# Patient Record
Sex: FEMALE | Race: BLACK OR AFRICAN AMERICAN | Employment: UNEMPLOYED | URBAN - METROPOLITAN AREA
[De-identification: names, ages, dates, MRNs, and addresses within clinical notes are randomized per-mention and may not be internally consistent; named-entity substitution may affect disease eponyms.]

---

## 2024-04-01 ENCOUNTER — TELEPHONE (OUTPATIENT)
Dept: ORTHOPEDIC SURGERY | Age: 29
End: 2024-04-01

## 2024-04-15 ENCOUNTER — OFFICE VISIT (OUTPATIENT)
Dept: ORTHOPEDIC SURGERY | Age: 29
End: 2024-04-15
Payer: COMMERCIAL

## 2024-04-15 VITALS — HEIGHT: 63 IN

## 2024-04-15 DIAGNOSIS — R29.898 WEAKNESS OF RIGHT LEG: ICD-10-CM

## 2024-04-15 DIAGNOSIS — M25.561 RIGHT KNEE PAIN, UNSPECIFIED CHRONICITY: Primary | ICD-10-CM

## 2024-04-15 PROCEDURE — 99204 OFFICE O/P NEW MOD 45 MIN: CPT | Performed by: PHYSICIAN ASSISTANT

## 2024-04-15 ASSESSMENT — ENCOUNTER SYMPTOMS
SHORTNESS OF BREATH: 0
VOMITING: 0
COUGH: 0
COLOR CHANGE: 0

## 2024-04-15 NOTE — PROGRESS NOTES
[facility] PHYSICIANS [facility] ORTHO SPECIALISTS  [address]  [place] [address]  Dept: [phone]    Ambulatory Orthopedic New Patient Visit      CHIEF COMPLAINT:    Chief Complaint   Patient presents with    Pain     New Patient, Rt knee pain. Fell last year.        HISTORY OF PRESENT ILLNESS:      Patient is Deaf - ASL interpreters used    #[id number] VaishaliLam  #[id number] - Belen    The patient is a 28 y.o. female who is being seen  for consultation and evaluation of 5 years of right leg weakness/stiffness. She notes that when she was 22yo she had pain within the right knee and her family didn't help her to get medical evaluation. She notes that she feels like her right knee and ankle are weak which causes her to be unsteady when she walks. She notes that she does not use a can or a walker.    She denies a specific trauma, injury or surgery to the right hip, knee or ankle. She denies symptoms within the left lower extremity. She denies numbness or tingling in the right lower extremity. She denies muscle tightness but states that she's been waling with a limp. She denies pain in her low back or right hip. She arrived in a wheelchair today.      Past Medical History:    No past medical history on file.    Past Surgical History:    No past surgical history on file.    Current Medications:   No current outpatient medications on file.     No current facility-administered medications for this visit.       Allergies:    Patient has no known allergies.    Social History:   Social History     Socioeconomic History    Marital status: Single     Spouse name: Not on file    Number of children: Not on file    Years of education: Not on file    Highest education level: Not on file   Occupational History    Not on file   Tobacco Use    Smoking status: Never    Smokeless tobacco: Never   Substance and Sexual Activity    Alcohol use: Not on file    Drug use: Not on file    Sexual

## 2024-05-24 DIAGNOSIS — R29.898 WEAKNESS OF RIGHT LEG: ICD-10-CM

## 2024-05-24 DIAGNOSIS — M54.50 LOW BACK PAIN, UNSPECIFIED BACK PAIN LATERALITY, UNSPECIFIED CHRONICITY, UNSPECIFIED WHETHER SCIATICA PRESENT: Primary | ICD-10-CM

## 2024-05-24 NOTE — PROGRESS NOTES
Patient is unable to have a MRI of her lumbar spine because she has a cochlear implant. I am going to place an order to neurosurgery for further evaluation of her lower leg weakness likely stemming from her lumbar spine.

## 2024-07-01 ENCOUNTER — OFFICE VISIT (OUTPATIENT)
Dept: NEUROSURGERY | Age: 29
End: 2024-07-01
Payer: COMMERCIAL

## 2024-07-01 VITALS
HEART RATE: 97 BPM | SYSTOLIC BLOOD PRESSURE: 101 MMHG | BODY MASS INDEX: 31.86 KG/M2 | DIASTOLIC BLOOD PRESSURE: 70 MMHG | HEIGHT: 63 IN | WEIGHT: 179.8 LBS

## 2024-07-01 DIAGNOSIS — R10.9 RIGHT LATERAL ABDOMINAL PAIN: ICD-10-CM

## 2024-07-01 DIAGNOSIS — M54.50 CHRONIC RIGHT-SIDED LOW BACK PAIN WITHOUT SCIATICA: Primary | ICD-10-CM

## 2024-07-01 DIAGNOSIS — G89.29 CHRONIC RIGHT-SIDED LOW BACK PAIN WITHOUT SCIATICA: Primary | ICD-10-CM

## 2024-07-01 PROCEDURE — 99203 OFFICE O/P NEW LOW 30 MIN: CPT | Performed by: NURSE PRACTITIONER

## 2024-07-01 NOTE — PROGRESS NOTES
Eyes: Negative for discharge and itching.   Respiratory: Negative for choking and chest tightness.    Cardiovascular: Negative for chest pain and leg swelling.   Gastrointestinal: Negative for nausea and abdominal pain.   Endocrine: Negative for cold intolerance and heat intolerance.   Genitourinary: Negative for frequency and flank pain.   Musculoskeletal: Negative for myalgias and joint swelling.   Skin: Negative for rash and wound.   Allergic/Immunologic: Negative for environmental allergies and food allergies.   Hematological: Negative for adenopathy. Does not bruise/bleed easily.   Psychiatric/Behavioral: Negative for self-injury. The patient is not nervous/anxious.      Physical Exam:      /70 (Site: Left Upper Arm, Position: Sitting, Cuff Size: Large Adult)   Pulse 97   Ht 1.6 m (5' 3\")   Wt 81.6 kg (179 lb 12.8 oz)   BMI 31.85 kg/m²   Estimated body mass index is 31.85 kg/m² as calculated from the following:    Height as of this encounter: 1.6 m (5' 3\").    Weight as of this encounter: 81.6 kg (179 lb 12.8 oz).    General:  Jeramie Lainez is a 28 y.o. year old female who appears her stated age.   HEENT: Normocephalic atraumatic. Neck supple.  Chest: regular rate; pulses equal  Abdomen: Soft nontender nondistended.  Ext: DP and PT pulses 2+, good cap refill  Neuro    Mentation  Appropriate affect  Registration intact  Orientation intact  Judgment intact to situation    Cranial Nerves:   Pupils equal and reactive to light  Extraocular motion intact  Face and shrug symmetric  Tongue midline  No dysarthria  v1-3 sensation symmetric, masseter tone symmetric    Sensation: Intact    Motor  L deltoid 5/5; R deltoid 5/5  L biceps 5/5; R biceps 5/5  L triceps 5/5; R triceps 5/5  L wrist extension 5/5; R wrist extension 5/5  L intrinsics 5/5; R intrinsics 5/5     L hip flexion 5/5 , R hip flexion 5/5  L knee extension 5/5; R knee extension 5/5  L Dorsiflexion 5/5; R dorsiflexion 5/5  L Plantarflexion

## 2024-07-01 NOTE — CONSULTS
Session ID: [id number]  Language: ASL   ID: #[id number]   Name: Carol Implemented All Universal Safety Interventions:  Kake to call system. Call bell, personal items and telephone within reach. Instruct patient to call for assistance. Room bathroom lighting operational. Non-slip footwear when patient is off stretcher. Physically safe environment: no spills, clutter or unnecessary equipment. Stretcher in lowest position, wheels locked, appropriate side rails in place.

## 2024-07-15 ENCOUNTER — HOSPITAL ENCOUNTER (OUTPATIENT)
Dept: PHYSICAL THERAPY | Facility: CLINIC | Age: 29
Setting detail: THERAPIES SERIES
Discharge: HOME | End: 2024-07-15
Payer: COMMERCIAL

## 2024-07-15 PROCEDURE — 97110 THERAPEUTIC EXERCISES: CPT

## 2024-07-15 PROCEDURE — 97162 PT EVAL MOD COMPLEX 30 MIN: CPT

## 2024-07-16 NOTE — CONSULTS
Silver Fall Risk Assessment    Patient Name:  Jeramie Lainez  : 1995    Risk Factor Scale  Score   History of Falls [x] Yes  [] No 25  0 25   Secondary Diagnosis [] Yes  [x] No 15  0 0   Ambulatory Aid [] Furniture  [x] Crutches/cane/walker  [] None/bedrest/wheelchair/nurse 30  15  0 15   IV/Heparin Lock [] Yes  [x] No 20  0 0   Gait/Transferring [x] Impaired  [] Weak  [] Normal/bedrest/immobile 20  10  0 20   Mental Status [] Forgets limitations  0 Oriented to own ability 15  0       Total:60     Based on the Assessment score: check the appropriate box.    []  No intervention needed   Low =   Score of 0-24    []  Use standard prevention interventions Moderate =  Score of 24-44   [] Give patient handout and discuss fall prevention strategies   [] Establish goal of education for patient/family RE: fall prevention strategies    [x]  Use high risk prevention interventions High = Score of 45 and higher   [x] Give patient handout and discuss fall prevention strategies   [x] Establish goal of education for patient/family Re: fall prevention strategies   [x] Discuss lifeline / other resources    Electronically signed by:   FANNY ALTMAN, PT  Date: 2024        Outpatient Physical Rehabilitation Fall Prevention Intervention    Exercises for balance and improving leg strength are beneficial    Use handrails when walking up and down the stairs. You many want handrails on both sides of the stairs.  Turn on the lights before entering a dark room.  Use night lights and have a lamp close at night. Get up slowly. Sit for a few seconds before rising.   Remove all throw rugs to prevent tripping.  Use a reacher to help  items.  If using a step stool use one with handrail. DO NOT stand on a chair.   Carry a cell phone with you and/or have a house phone you can reach from the ground. May use a whistle.  Take your cell phone with you when going out the back door, to the 
tasks)  Exercises:  Exercise Reps/ Time Weight/ Level Comments   SciFit   next   bridging 10  Difficult to control right leg   SLR right     Piriformis stretch 6x each 30 sec holds                gait      Sit to stands from mat            Practice rolling to side to then come upright to sitting X     Other:  Reports slight relief after massage gun - cushioned head    Specific Instructions for next treatment:  Further assess gait - trial with walker  Mechanics if using cane to move it to the left side.  Issue HEP, mat strengthening for core and LE's    Other - per Epic on 4- was given DME information for a walker, ask pt if obtained??? (Dr. Ybarra's office, orthopedics)      Evaluation Complexity:  History (Personal factors, comorbidities) [] 0 [x] 1-2 [] 3+   Exam (limitations, restrictions) [] 1-2 [] 3 [x] 4+  Gait, balance, strength, pain, bladder   Clinical presentation (progression) [] Stable [] Evolving  [x] Unstable   Decision Making [] Low [] Moderate [] High    [] Low Complexity [] Moderate Complexity [] High Complexity       Treatment Charges: Mins Units   [x] Evaluation       []  Low       [x]  Moderate       []  High 37 1   []  Modalities     [x]  Ther Exercise 10 1   [x]  Manual Therapy 8 0   []  Ther Activities     []  Aquatics     []  Vasocompression     []  Other       TOTAL BILLABLE TIME: 55 min    Time in: 2:05 pm      Time out: 3:00 pm    Electronically signed by: FANNY ALTMAN PT        Physician Signature:________________________________Date:__________________  By signing above or cosigning this note, I have reviewed this plan of care and certify a need for medically necessary rehabilitation services.     *PLEASE SIGN ABOVE AND FAX BACK ALL PAGES*

## 2024-07-24 ENCOUNTER — HOSPITAL ENCOUNTER (OUTPATIENT)
Dept: PHYSICAL THERAPY | Facility: CLINIC | Age: 29
Setting detail: THERAPIES SERIES
Discharge: HOME | End: 2024-07-24
Payer: COMMERCIAL

## 2024-07-24 NOTE — FLOWSHEET NOTE
[] [facility]  Outpatient Rehabilitation &  Therapy  [address]  [phone]  [phone] [x] [facility]  Outpatient Rehabilitation &  Therapy  [address]  P: [phone]  F: [phone] [] [facility]  Outpatient Rehabilitation &  Therapy  [address]  P: [phone]  F: [phone] [] [facility]  Outpatient Rehabilitation &  Therapy  [address]  [phone]  [phone] [] [facility]  Outpatient Rehabilitation &  Therapy  [address]   P: [phone]  F: [phone]  [] [facility]  Outpatient Rehabilitation &  Therapy  [address]  P: [phone]  F: [phone] [] [facility]  Outpatient Rehabilitation &  Therapy  [address]  P: [phone]  F: [phone] [] [facility]  Outpatient Rehabilitation &  Therapy  [address]  P: [phone]  F: [phone] [] [facility]  Outpatient Rehabilitation &  Therapy  [address]  P: [phone]  F: [phone]  [] [facility] Outpatient Rehabilitation &  Therapy  [address]  P: [phone]  F: [phone]     Therapy Cancel/No Show note    Date: 2024  Patient: Jeramie Lainez  : 1995  MRN: [id number]    Cancels/No Shows to date: 1/0    For today's appointment patient:    [x]  Cancelled    [] Rescheduled appointment    [] No-show     Reason given by patient:    []  Patient ill    []  Conflicting appointment    [x] No transportation      [] Conflict with work    [] No reason given    [] Weather related    [] COVID-19    [] Other:      Comments:        [x] Next appointment was confirmed    Electronically signed by: DANUTA ROGERS PTA

## 2024-07-29 ENCOUNTER — HOSPITAL ENCOUNTER (OUTPATIENT)
Dept: PHYSICAL THERAPY | Facility: CLINIC | Age: 29
Setting detail: THERAPIES SERIES
Discharge: HOME | End: 2024-07-29
Payer: COMMERCIAL

## 2024-07-29 PROCEDURE — 97140 MANUAL THERAPY 1/> REGIONS: CPT

## 2024-07-29 PROCEDURE — 97110 THERAPEUTIC EXERCISES: CPT

## 2024-07-29 NOTE — FLOWSHEET NOTE
[] [facility]  Outpatient Rehabilitation &  Therapy  [address]  [phone]  [phone] [x] [facility]  Outpatient Rehabilitation &  Therapy  [address]  P: [phone]  F: [phone] [] [facility]  Outpatient Rehabilitation &  Therapy  [address]  P: [phone]  F: [phone]  [] [facility] Outpatient Rehabilitation &  Therapy  [address]  P: [phone]  F: [phone]     Physical Therapy Daily Treatment Note    Date:  2024  Patient Name:  Jeramie Lianez    :  1995  MRN: [id number]  Physician: MYESHA Adams - IRENE                                 Insurance: [employer]  (30 visits till needs auth)  Medical Diagnosis: M54.50, G89.29 (ICD-10-CM) - Chronic right-sided low back pain without sciatica                        Right LE weakness, gait disturbances             Rehab Codes: M54.59,  M25.551, M62.591, Z91.81, R20.2R26.2, R26.89, M79.651  Onset Date: 2024 date of script (ongoing since )                         Next 's appt.: 2024      Used - (for ASL, initial  also added additional  for further clarification)  Caprice Paredes (deaf)  Visit# / total visits: ;    Cancels/No Shows: 1  0    Session Code 525 41-  used    Session ID: [id number]  Language: ASL   ID: #[id number]   Name: River          Subjective:    Pain:  [x] Yes  [] No Location: right low back and thigh N/A Pain Rating: (0-10 scale) 6/10  Pain altered Tx:  [x] No  [] Yes  Action:  Comments:Feeling good, any pain , some in the right hip region.  Started using standard walker, pain on right side,  using Epsom salt, helpful. Some improvement with walking, \"slipped a few times\" but not fallen. Pain down right thigh, up to 6/10   After last session, little better, some improvement, but slow

## 2024-08-01 ENCOUNTER — APPOINTMENT (OUTPATIENT)
Dept: PHYSICAL THERAPY | Facility: CLINIC | Age: 29
End: 2024-08-01
Payer: COMMERCIAL

## 2024-08-05 ENCOUNTER — HOSPITAL ENCOUNTER (OUTPATIENT)
Dept: PHYSICAL THERAPY | Facility: CLINIC | Age: 29
Setting detail: THERAPIES SERIES
Discharge: HOME | End: 2024-08-05
Payer: COMMERCIAL

## 2024-08-05 PROCEDURE — 97110 THERAPEUTIC EXERCISES: CPT

## 2024-08-05 PROCEDURE — 97140 MANUAL THERAPY 1/> REGIONS: CPT

## 2024-08-05 NOTE — FLOWSHEET NOTE
rather making her aware, fiance in room and also aware. --- none present on 8/5  [x] Patient would continue to benefit from skilled physical therapy services in order to: improve gait patterning, decrease fall risk, improve LE strength and improve core strength.     STG: (to be met in 7 treatments)  ? Pain:from 6/10 to 4/10 max in right low back to allow her to actively exercise for 25 minutes for strengthening and stretching  Stand up with equal weight bearing right to left legs demonstrating improved upright posturing  ? Strength: Ability to stand for 5 minutes at counter for light grooming, kitchen work  Ability to walk safely for 300 ft with appropriate assistive device with stand by assistance.  Patient to be independent with home exercise program as demonstrated by performance with correct form without cues.  Demonstrate Knowledge of fall prevention-7- initiated, rec use of walker, shower seat.  Ability to transfer sitting to supine and sitting to standing independently.     LTG: (to be met in 14 treatments)  Restore full, pain-free, lumbar AROM limitations throughout to reduce difficulty with ADLs including leaning forward to put shoes and socks on and off  50% pain reduction to allow her to stand 10 minutes for self care, kitchen tasks  Ability to walk 450 feet in clinic safely with appropriate assistive device including up and down 4 inch step for curb simulation     4.  Demonstrate good standing balance while able to use UE's for tasks like moving 3# wt around counter to simulate kitchen, bathroom tasks     Patient goals:    less pain, walk better    Pt. Education:  [x] Yes  [] No  [] Reviewed Prior HEP/Ed  Method of Education: [x] Verbal  [x] Demo  [x] Written  [id number] send additional exercises to her code, pt understood-communicates she has been watching the videos  Access Code: [email or web address]  URL: [email or web address]  Date: 07/29/2024  Prepared by: Norma Allison    Exercises  -  -

## 2024-08-13 ENCOUNTER — HOSPITAL ENCOUNTER (OUTPATIENT)
Dept: PHYSICAL THERAPY | Facility: CLINIC | Age: 29
Setting detail: THERAPIES SERIES
Discharge: HOME | End: 2024-08-13
Payer: COMMERCIAL

## 2024-08-13 PROCEDURE — 97110 THERAPEUTIC EXERCISES: CPT

## 2024-08-13 PROCEDURE — 97140 MANUAL THERAPY 1/> REGIONS: CPT

## 2024-08-13 NOTE — FLOWSHEET NOTE
exercises to her code, pt understood-communicates she has been watching the videos  Access Code: [email or web address]  URL: [email or web address]  Date: 07/29/2024  Prepared by: Norma Allison    Exercises  -  - Standing Heel Raise with Chair Support  - 2 x daily - 7 x weekly - 1 sets - 15 reps  - Standing Hip Extension with Counter Support  - 2 x daily - 7 x weekly - 1 sets - 15 reps  - Standing Hip Abduction with Counter Support  - 2 x daily - 7 x weekly - 1 sets - 15 reps  - Standing March with Counter Support  - 2 x daily - 7 x weekly - 1 sets - 15 reps  - Sit to Stand  - 2 x daily - 7 x weekly - 1 sets - 8 reps    Date: 07/15/2024  Prepared by: Norma Allison  Exercises  - Supine Bridge  - 2 x daily - 7 x weekly - 1 sets - 10 reps - 5 hold  - Supine Active Straight Leg Raise  - 2 x daily - 7 x weekly - 1 sets - 10 reps  - Supine Piriformis Stretch  - 2 x daily - 7 x weekly - 1 sets - 10 reps - 10 hold    Date: 08/05/2024  Prepared by: Larry  Exercises  - Seated Long Arc Quad  - 1 x daily - 7 x weekly - 2 sets - 10 reps  - Seated Hamstring Curl with Anchored Resistance  - 1 x daily - 7 x weekly - 2 sets - 10 reps  - Seated Hip Abduction with Resistance  - 1 x daily - 7 x weekly - 2 sets - 10 reps  - Seated Hip Adduction Squeeze with Ball  - 1 x daily - 7 x weekly - 2 sets - 10 reps    Comprehension of Education:  [] Verbalizes understanding.  [] Demonstrates understanding.  [] Needs review.  [x] Demonstrates/verbalizes HEP/Ed previously given.     Plan: [x] Continue current frequency toward long and short term goals.    [x] Specific Instructions for subsequent treatments: continue to work on core, LE strengthening, gait mechanics      Time In: 200 pm            Time Out: 300  pm    Electronically signed by:  Larry Claudio PTA

## 2024-08-14 ENCOUNTER — HOSPITAL ENCOUNTER (OUTPATIENT)
Dept: PHYSICAL THERAPY | Facility: CLINIC | Age: 29
Setting detail: THERAPIES SERIES
Discharge: HOME | End: 2024-08-14
Payer: COMMERCIAL

## 2024-08-14 PROCEDURE — 97110 THERAPEUTIC EXERCISES: CPT

## 2024-08-14 NOTE — FLOWSHEET NOTE
[] [facility]  Outpatient Rehabilitation &  Therapy  [address]  [phone]  [phone] [x] [place]  Outpatient Rehabilitation &  Therapy  [address]  P: [phone]  F: [phone] [] [facility]  Outpatient Rehabilitation &  Therapy  [address]  P: [phone]  F: [phone]  [] [facility] Outpatient Rehabilitation &  Therapy  [address]  P: [phone]  F: [phone]     Physical Therapy Daily Treatment Note    Date:  2024  Patient Name:  Jeramie Lainez    :  1995  MRN: [id number]  Physician: MYESHA Adams - IRENE                                 Insurance: [employer]  (30 visits till needs auth)  Medical Diagnosis: M54.50, G89.29 (ICD-10-CM) - Chronic right-sided low back pain without sciatica            Right LE weakness, gait disturbances             Rehab Codes: M54.59,  M25.551, M62.591, Z91.81, R20.2R26.2, R26.89, M79.651  Onset Date: 2024 date of script (ongoing since )                         Next 's appt.: 2024      Used - (for ASL, initial  also added additional  for further clarification) ---  not available until after 8 am  Caprice Paredes (deaf)  Visit# / total visits: ;    Cancels/No Shows:     Session ID: [id number]  Language: ASL   ID: #[id number]   Name: Clarence      Subjective:    Pain:  [x] Yes  [] No Location:  Pain Rating: (0-10 scale) 0/10  Pain altered Tx:  [] No  [x] Yes  Action:discontinued hypervolt as no longer having pain  Comments: Pt arrives feeling good, tired, no pain in leg or back .    At home walking is going good.If walks needs a break at home but working on increasing ability to walk.      Objective:  Modalities: Precautions:balance deficits SBA  Exercises:  Exercise Reps/ Time Weight/ Level Comments   NuStep  6

## 2024-08-20 ENCOUNTER — HOSPITAL ENCOUNTER (OUTPATIENT)
Dept: PHYSICAL THERAPY | Facility: CLINIC | Age: 29
Setting detail: THERAPIES SERIES
Discharge: HOME | End: 2024-08-20
Payer: COMMERCIAL

## 2024-08-20 PROCEDURE — 97110 THERAPEUTIC EXERCISES: CPT

## 2024-08-20 NOTE — FLOWSHEET NOTE
step for curb simulation     4.  Demonstrate good standing balance while able to use UE's for tasks like moving 3# wt around counter to simulate kitchen, bathroom tasks     Patient goals:    less pain, walk better    Pt. Education:  [x] Yes  [] No  [x] Reviewed Prior HEP/Ed  Method of Education: [x] Verbal  [x] Demo  [] Written (8/14 pt reports will access on video)  Access Code: [email or web address]  URL: [email or web address]  Date: 08/14/2024  Prepared by: Norma Allison    Exercises  - Standing Terminal Knee Extension with Resistance  - 2 x daily - 7 x weekly - 1 sets - 10 reps  [id number] send additional exercises to her code, pt understood-communicates she has been watching the videos    Access Code: [email or web address]  URL: [email or web address]  Date: 07/29/2024  Prepared by: Norma Allison    Exercises  -  - Standing Heel Raise with Chair Support  - 2 x daily - 7 x weekly - 1 sets - 15 reps  - Standing Hip Extension with Counter Support  - 2 x daily - 7 x weekly - 1 sets - 15 reps  - Standing Hip Abduction with Counter Support  - 2 x daily - 7 x weekly - 1 sets - 15 reps  - Standing March with Counter Support  - 2 x daily - 7 x weekly - 1 sets - 15 reps  - Sit to Stand  - 2 x daily - 7 x weekly - 1 sets - 8 reps    Date: 07/15/2024  Prepared by: Norma Allison  Exercises  - Supine Bridge  - 2 x daily - 7 x weekly - 1 sets - 10 reps - 5 hold  - Supine Active Straight Leg Raise  - 2 x daily - 7 x weekly - 1 sets - 10 reps  - Supine Piriformis Stretch  - 2 x daily - 7 x weekly - 1 sets - 10 reps - 10 hold    Date: 08/05/2024  Prepared by: Larry  Exercises  - Seated Long Arc Quad  - 1 x daily - 7 x weekly - 2 sets - 10 reps  - Seated Hamstring Curl with Anchored Resistance  - 1 x daily - 7 x weekly - 2 sets - 10 reps  - Seated Hip Abduction with Resistance  - 1 x daily - 7 x weekly - 2 sets - 10 reps  - Seated Hip Adduction Squeeze with Ball  - 1 x daily - 7 x weekly - 2 sets - 10 reps    Comprehension of

## 2024-08-26 ENCOUNTER — HOSPITAL ENCOUNTER (OUTPATIENT)
Dept: PHYSICAL THERAPY | Facility: CLINIC | Age: 29
Setting detail: THERAPIES SERIES
Discharge: HOME | End: 2024-08-26
Payer: COMMERCIAL

## 2024-08-26 ENCOUNTER — OFFICE VISIT (OUTPATIENT)
Dept: NEUROSURGERY | Age: 29
End: 2024-08-26
Payer: COMMERCIAL

## 2024-08-26 VITALS
HEART RATE: 88 BPM | BODY MASS INDEX: 31.71 KG/M2 | SYSTOLIC BLOOD PRESSURE: 121 MMHG | OXYGEN SATURATION: 98 % | WEIGHT: 179 LBS | DIASTOLIC BLOOD PRESSURE: 86 MMHG | HEIGHT: 63 IN

## 2024-08-26 DIAGNOSIS — R25.8 CLONUS: ICD-10-CM

## 2024-08-26 DIAGNOSIS — Z96.21 COCHLEAR IMPLANT STATUS: ICD-10-CM

## 2024-08-26 DIAGNOSIS — R26.0 ATAXIC GAIT: ICD-10-CM

## 2024-08-26 DIAGNOSIS — R29.2 HOFFMAN SIGN PRESENT: ICD-10-CM

## 2024-08-26 DIAGNOSIS — M47.26 OTHER SPONDYLOSIS WITH RADICULOPATHY, LUMBAR REGION: Primary | ICD-10-CM

## 2024-08-26 PROCEDURE — 97110 THERAPEUTIC EXERCISES: CPT

## 2024-08-26 PROCEDURE — 99214 OFFICE O/P EST MOD 30 MIN: CPT | Performed by: NURSE PRACTITIONER

## 2024-08-26 NOTE — FLOWSHEET NOTE
Demonstrates understanding.  [] Needs review.  [x] Demonstrates/verbalizes HEP/Ed previously given.     Plan: [x] Continue current frequency toward long and short term goals.    [x] Specific Instructions for subsequent treatments: continue to work on core, LE strengthening, gait mechanics      Time In: 3: 55 pm            Time Out:   4: 45 pm    Electronically signed by:  FANNY ALTMAN, PT

## 2024-08-26 NOTE — PROGRESS NOTES
beats  Babinski L: neg  Babinski R: neg    +Ataxic gait    Studies Review:     PT notes reviewed    Assessment and Plan:     1. Other spondylosis with radiculopathy, lumbar region    2. Ataxic gait    3. Clonus    4. Smith sign present    5. Cochlear implant status         Plan: Patient with overall improvement to pain, however still struggling with ataxic gait, some right lower extremity spasticity as well as positive clonus to right foot and Tarah's bilaterally.  Recommend proceeding with advanced imaging for further evaluation of spinal cord compression.  Patient unable to undergo MRI due to cochlear implant.  Will obtain myelogram of cervical, thoracic as well as lumbar spine.  Patient to return in 4 weeks for reevaluation.  Continue therapy measures.    Followup: Return in about 4 weeks (around 9/23/2024), or if symptoms worsen or fail to improve.    Prescriptions Ordered:  No orders of the defined types were placed in this encounter.     Orders Placed:  Orders Placed This Encounter   Procedures    IR MYELOGRAM 2 OR MORE REGIONS     Standing Status:   Future     Standing Expiration Date:   8/26/2025     Order Specific Question:   Reason for exam:     Answer:   ataxia, clonus, +hoffmans    CT CERVICAL SPINE W CONTRAST     Standing Status:   Future     Standing Expiration Date:   8/26/2025     Order Specific Question:   STAT Creatinine as needed:     Answer:   No     Order Specific Question:   Reason for exam:     Answer:   post-myelogram    CT THORACIC SPINE W CONTRAST     Standing Status:   Future     Standing Expiration Date:   8/26/2025     Order Specific Question:   Additional Contrast?     Answer:   None     Order Specific Question:   STAT Creatinine as needed:     Answer:   No     Order Specific Question:   Reason for exam:     Answer:   post-myelogram    CT LUMBAR SPINE W CONTRAST     Standing Status:   Future     Standing Expiration Date:   8/26/2025     Order Specific Question:   STAT Creatinine as  needed:     Answer:   No     Order Specific Question:   Reason for exam:     Answer:   post-myelogram        Electronically signed by MYESHA Gallardo CNP on 8/26/2024 at 11:12 AM    Please note that this chart was generated using voice recognition Dragon dictation software.  Although every effort was made to ensure the accuracy of this automated transcription, some errors in transcription may have occurred.

## 2024-08-28 ENCOUNTER — HOSPITAL ENCOUNTER (OUTPATIENT)
Dept: PHYSICAL THERAPY | Facility: CLINIC | Age: 29
Setting detail: THERAPIES SERIES
Discharge: HOME | End: 2024-08-28
Payer: COMMERCIAL

## 2024-08-28 PROCEDURE — 97110 THERAPEUTIC EXERCISES: CPT

## 2024-08-28 NOTE — FLOWSHEET NOTE
[] [facility]  Outpatient Rehabilitation &  Therapy  [address]  [phone]  [phone] [x] [facility]  Outpatient Rehabilitation &  Therapy  [address]  P: [phone]  F: [phone] [] [facility]  Outpatient Rehabilitation &  Therapy  [address]  P: [phone]  F: [phone]  [] [facility] Outpatient Rehabilitation &  Therapy  [address]  P: [phone]  F: [phone]     Physical Therapy Daily Treatment Note    Date:  2024  Patient Name:  Jeramie Lainez    :  1995  MRN: [id number]  Physician: MYESHA Adams - IRENE                                 Insurance: [employer]  (30 visits till needs auth)  Medical Diagnosis: M54.50, G89.29 (ICD-10-CM) - Chronic right-sided low back pain without sciatica            Right LE weakness, gait disturbances             Rehab Codes: M54.59,  M25.551, M62.591, Z91.81, R20.2R26.2, R26.89, M79.651  Onset Date: 2024 date of script (ongoing since )                         Next 's appt.: 2024      Used - (for ASL, initial  also added additional  for further clarification) ---  not available until after 8 am  Caprice Paredes (deaf)  Visit# / total visits: ;    Cancels/No Shows: 1 / 0     session ID: [id number]    Subjective:    Pain:  [x] Yes  [] No Location:  Pain Rating: (0-10 scale) 0/10  Pain altered Tx:  [x] No  [] Yes  Action:    Comments: Pt. Arrives to therapy session continuing to deny any pain. Also denies any increased soreness or difficulty with the ex's performed at her last therapy session.    Objective:  Modalities:   Precautions:balance deficits SBA  Exercises:bolded completed  Exercise Reps/ Time Weight/ Level Comments   NuStep  6 min   L4          Supine      bridging 10x2   Difficult to control right leg   SLR -  shower seat , pt reports she is using  Ability to transfer sitting to supine and sitting to standing independently. 8- met     LTG: (to be met in 14 treatments)  Restore full, pain-free, lumbar AROM limitations throughout to reduce difficulty with ADLs including leaning forward to put shoes and socks on and off  50% pain reduction to allow her to stand 10 minutes for self care, kitchen tasks  Ability to walk 450 feet in clinic safely with appropriate assistive device including up and down 4 inch step for curb simulation     4.  Demonstrate good standing balance while able to use UE's for tasks like moving 3# wt around counter to simulate kitchen, bathroom tasks     Patient goals:    less pain, walk better    Pt. Education:  [x] Yes  [] No  [x] Reviewed Prior HEP/Ed  Method of Education: [x] Verbal  [x] Demo  [x] Written   Added sitting toe raises and reissued HEP    Access Code: [email or web address]  URL: [email or web address]  Date: 08/14/2024  Prepared by: Norma Allison    Exercises  - Standing Terminal Knee Extension with Resistance  - 2 x daily - 7 x weekly - 1 sets - 10 reps  [id number] send additional exercises to her code, pt understood-communicates she has been watching the videos    Access Code: [email or web address]  URL: [email or web address]  Date: 07/29/2024  Prepared by: Norma Allison    Exercises  -  - Standing Heel Raise with Chair Support  - 2 x daily - 7 x weekly - 1 sets - 15 reps  - Standing Hip Extension with Counter Support  - 2 x daily - 7 x weekly - 1 sets - 15 reps  - Standing Hip Abduction with Counter Support  - 2 x daily - 7 x weekly - 1 sets - 15 reps  - Standing March with Counter Support  - 2 x daily - 7 x weekly - 1 sets - 15 reps  - Sit to Stand  - 2 x daily - 7 x weekly - 1 sets - 8 reps    Date: 07/15/2024  Prepared by: Norma Allison  Exercises  - Supine Bridge  - 2 x daily - 7 x weekly - 1 sets - 10 reps - 5 hold  - Supine Active Straight Leg Raise  - 2 x daily - 7 x weekly - 1

## 2024-09-03 ENCOUNTER — HOSPITAL ENCOUNTER (OUTPATIENT)
Dept: PHYSICAL THERAPY | Facility: CLINIC | Age: 29
Setting detail: THERAPIES SERIES
Discharge: HOME | End: 2024-09-03
Payer: COMMERCIAL

## 2024-09-03 PROCEDURE — 97110 THERAPEUTIC EXERCISES: CPT

## 2024-09-03 NOTE — FLOWSHEET NOTE
seated rest break. Pt with intermittent reports of R knee discomfort through out session that is addressed with CP end of treatment.       [] No change.     [x] Other:    8/26 pending myelogram  [] Patient would continue to benefit from skilled physical therapy services in order to: improve gait patterning, decrease fall risk, improve LE strength and improve core strength.     STG: (to be met in 7 treatments) addressed by Norma Allison, PT 8-  ? Pain:from 6/10 to 4/10 max in right low back to allow her to actively exercise for 25 minutes for strengthening and stretching 8- met  Stand up with equal weight bearing right to left legs demonstrating improved upright posturing- 8/26/2024 progressing, improved with cueing  ? Strength: Ability to stand for 5 minutes at counter for light grooming, kitchen work 8- met per pt, ~ 10-15 minutes  Ability to walk safely for 300 ft with appropriate assistive device with stand by assistance. 8- met  Patient to be independent with home exercise program as demonstrated by performance with correct form without cues. 8- met  Demonstrate Knowledge of fall prevention-7- initiated, rec use of walker, shower seat , pt reports she is using  Ability to transfer sitting to supine and sitting to standing independently. 8- met     LTG: (to be met in 14 treatments)  Restore full, pain-free, lumbar AROM limitations throughout to reduce difficulty with ADLs including leaning forward to put shoes and socks on and off  50% pain reduction to allow her to stand 10 minutes for self care, kitchen tasks  Ability to walk 450 feet in clinic safely with appropriate assistive device including up and down 4 inch step for curb simulation     4.  Demonstrate good standing balance while able to use UE's for tasks like moving 3# wt around counter to simulate kitchen, bathroom tasks     Patient goals:    less pain, walk better    Pt. Education:  [x] Yes  [] No  [x]

## 2024-09-09 ENCOUNTER — HOSPITAL ENCOUNTER (OUTPATIENT)
Dept: PHYSICAL THERAPY | Facility: CLINIC | Age: 29
Setting detail: THERAPIES SERIES
Discharge: HOME | End: 2024-09-09
Payer: COMMERCIAL

## 2024-09-09 PROCEDURE — 97110 THERAPEUTIC EXERCISES: CPT

## 2024-09-10 ENCOUNTER — HOSPITAL ENCOUNTER (OUTPATIENT)
Dept: CT IMAGING | Age: 29
Discharge: HOME | End: 2024-09-12
Payer: COMMERCIAL

## 2024-09-10 ENCOUNTER — HOSPITAL ENCOUNTER (OUTPATIENT)
Dept: INTERVENTIONAL RADIOLOGY/VASCULAR | Age: 29
Discharge: HOME | End: 2024-09-12
Payer: COMMERCIAL

## 2024-09-10 VITALS
TEMPERATURE: 98 F | WEIGHT: 175 LBS | OXYGEN SATURATION: 100 % | SYSTOLIC BLOOD PRESSURE: 112 MMHG | DIASTOLIC BLOOD PRESSURE: 71 MMHG | BODY MASS INDEX: 31.01 KG/M2 | RESPIRATION RATE: 20 BRPM | HEIGHT: 63 IN | HEART RATE: 81 BPM

## 2024-09-10 DIAGNOSIS — R25.8 CLONUS: ICD-10-CM

## 2024-09-10 DIAGNOSIS — Z96.21 COCHLEAR IMPLANT STATUS: ICD-10-CM

## 2024-09-10 DIAGNOSIS — R29.2 HOFFMAN SIGN PRESENT: ICD-10-CM

## 2024-09-10 LAB — HCG SERPL QL: NEGATIVE

## 2024-09-10 PROCEDURE — 7100000041 HC SPAR PHASE II RECOVERY - ADDTL 15 MIN

## 2024-09-10 PROCEDURE — 7100000040 HC SPAR PHASE II RECOVERY - FIRST 15 MIN

## 2024-09-10 PROCEDURE — 72129 CT CHEST SPINE W/DYE: CPT

## 2024-09-10 PROCEDURE — 72126 CT NECK SPINE W/DYE: CPT

## 2024-09-10 PROCEDURE — 62305 MYELOGRAPHY LUMBAR INJECTION: CPT

## 2024-09-10 PROCEDURE — 72132 CT LUMBAR SPINE W/DYE: CPT

## 2024-09-10 PROCEDURE — 2580000003 HC RX 258: Performed by: PHYSICIAN ASSISTANT

## 2024-09-10 PROCEDURE — 6360000004 HC RX CONTRAST MEDICATION: Performed by: NURSE PRACTITIONER

## 2024-09-10 PROCEDURE — 84703 CHORIONIC GONADOTROPIN ASSAY: CPT

## 2024-09-10 RX ORDER — IOPAMIDOL 612 MG/ML
11 INJECTION, SOLUTION INTRATHECAL
Status: COMPLETED | OUTPATIENT
Start: 2024-09-10 | End: 2024-09-10

## 2024-09-10 RX ORDER — SODIUM CHLORIDE 9 MG/ML
INJECTION, SOLUTION INTRAVENOUS CONTINUOUS
Status: DISCONTINUED | OUTPATIENT
Start: 2024-09-10 | End: 2024-09-13 | Stop reason: HOSPADM

## 2024-09-10 RX ADMIN — SODIUM CHLORIDE: 9 INJECTION, SOLUTION INTRAVENOUS at 14:41

## 2024-09-10 RX ADMIN — IOPAMIDOL 11 ML: 612 INJECTION, SOLUTION INTRATHECAL at 16:14

## 2024-09-10 ASSESSMENT — PAIN - FUNCTIONAL ASSESSMENT: PAIN_FUNCTIONAL_ASSESSMENT: 0-10

## 2024-09-10 ASSESSMENT — PAIN DESCRIPTION - DESCRIPTORS: DESCRIPTORS: ACHING;OTHER (COMMENT)

## 2024-09-11 ENCOUNTER — HOSPITAL ENCOUNTER (OUTPATIENT)
Dept: PHYSICAL THERAPY | Facility: CLINIC | Age: 29
Setting detail: THERAPIES SERIES
Discharge: HOME | End: 2024-09-11
Payer: COMMERCIAL

## 2024-09-11 PROCEDURE — 97116 GAIT TRAINING THERAPY: CPT

## 2024-09-11 PROCEDURE — 97110 THERAPEUTIC EXERCISES: CPT

## 2024-09-16 ENCOUNTER — HOSPITAL ENCOUNTER (OUTPATIENT)
Dept: PHYSICAL THERAPY | Facility: CLINIC | Age: 29
Setting detail: THERAPIES SERIES
Discharge: HOME | End: 2024-09-16
Payer: COMMERCIAL

## 2024-09-16 PROCEDURE — 97110 THERAPEUTIC EXERCISES: CPT

## 2024-09-18 ENCOUNTER — HOSPITAL ENCOUNTER (OUTPATIENT)
Dept: PHYSICAL THERAPY | Facility: CLINIC | Age: 29
Setting detail: THERAPIES SERIES
Discharge: HOME | End: 2024-09-18
Payer: COMMERCIAL

## 2024-09-18 PROCEDURE — 97112 NEUROMUSCULAR REEDUCATION: CPT

## 2024-09-18 PROCEDURE — 97110 THERAPEUTIC EXERCISES: CPT

## 2024-09-18 PROCEDURE — 97116 GAIT TRAINING THERAPY: CPT

## 2024-09-23 ENCOUNTER — OFFICE VISIT (OUTPATIENT)
Dept: NEUROSURGERY | Age: 29
End: 2024-09-23
Payer: COMMERCIAL

## 2024-09-23 VITALS
DIASTOLIC BLOOD PRESSURE: 69 MMHG | HEART RATE: 87 BPM | HEIGHT: 63 IN | WEIGHT: 175 LBS | BODY MASS INDEX: 31.01 KG/M2 | SYSTOLIC BLOOD PRESSURE: 112 MMHG

## 2024-09-23 DIAGNOSIS — R25.8 CLONUS: ICD-10-CM

## 2024-09-23 DIAGNOSIS — E04.1 THYROID NODULE INCIDENTALLY NOTED ON IMAGING STUDY: ICD-10-CM

## 2024-09-23 DIAGNOSIS — M47.26 OTHER SPONDYLOSIS WITH RADICULOPATHY, LUMBAR REGION: ICD-10-CM

## 2024-09-23 DIAGNOSIS — Z75.8 DOES NOT HAVE PRIMARY CARE PROVIDER: ICD-10-CM

## 2024-09-23 DIAGNOSIS — R26.0 ATAXIC GAIT: Primary | ICD-10-CM

## 2024-09-23 PROCEDURE — 99214 OFFICE O/P EST MOD 30 MIN: CPT | Performed by: NURSE PRACTITIONER

## 2024-09-25 ENCOUNTER — HOSPITAL ENCOUNTER (OUTPATIENT)
Dept: PHYSICAL THERAPY | Facility: CLINIC | Age: 29
Setting detail: THERAPIES SERIES
Discharge: HOME | End: 2024-09-25
Payer: COMMERCIAL

## 2024-09-25 PROCEDURE — 97116 GAIT TRAINING THERAPY: CPT

## 2024-09-25 PROCEDURE — 97110 THERAPEUTIC EXERCISES: CPT

## 2024-10-07 ENCOUNTER — HOSPITAL ENCOUNTER (OUTPATIENT)
Dept: PHYSICAL THERAPY | Facility: CLINIC | Age: 29
Setting detail: THERAPIES SERIES
Discharge: HOME | End: 2024-10-07
Payer: COMMERCIAL

## 2024-10-07 PROCEDURE — 97116 GAIT TRAINING THERAPY: CPT

## 2024-10-07 PROCEDURE — 97110 THERAPEUTIC EXERCISES: CPT

## 2024-10-07 NOTE — FLOWSHEET NOTE
treatments) addressed by Norma Allison, PT 8-  ? Pain:from 6/10 to 4/10 max in right low back to allow her to actively exercise for 25 minutes for strengthening and stretching 8- met  Stand up with equal weight bearing right to left legs demonstrating improved upright posturing- 8/26/2024 progressing, improved with cueing  ? Strength: Ability to stand for 5 minutes at counter for light grooming, kitchen work 8- met per pt, ~ 10-15 minutes  Ability to walk safely for 300 ft with appropriate assistive device with stand by assistance. 8- met  Patient to be independent with home exercise program as demonstrated by performance with correct form without cues. 8- met  Demonstrate Knowledge of fall prevention-7- initiated, rec use of walker, shower seat , pt reports she is using  Ability to transfer sitting to supine and sitting to standing independently. 8- met     LTG: (to be met in 14 treatments)- checked 9/25   Restore full, pain-free, lumbar AROM limitations throughout to reduce difficulty with ADLs including leaning forward to put shoes and socks on and off- met   50% pain reduction to allow her to stand 10 minutes for self care, kitchen tasks- Ongoing- able to stand witout pain however, weakness in R LE limits her from standing long periods of time  Ability to walk 450 feet in clinic safely with appropriate assistive device including up and down 4 inch step for curb simulation- met- Able to complete with use of RW      4.  Demonstrate good standing balance while able to use UE's for tasks like moving 3# wt around counter to simulate kitchen, bathroom tasks- Ongoing- does have LOB at times      Patient goals:    less pain, walk better    Pt. Education:  [x] Yes  [] No  [x] Reviewed Prior HEP/Ed  Method of Education: [x] Verbal [] Demo  [] Written     9/18: sit <> stands (verbal)   9-9-2024 cueing to extend right knee with stance phase of gait  Added sitting toe raises

## 2024-10-10 ENCOUNTER — HOSPITAL ENCOUNTER (OUTPATIENT)
Dept: PHYSICAL THERAPY | Facility: CLINIC | Age: 29
Setting detail: THERAPIES SERIES
Discharge: HOME | End: 2024-10-10
Payer: COMMERCIAL

## 2024-10-10 PROCEDURE — 97110 THERAPEUTIC EXERCISES: CPT

## 2024-10-10 NOTE — FLOWSHEET NOTE
extend right knee with stance phase of gait  Added sitting toe raises and reissued HEP  Access Code: [email or web address]  URL: [email or web address]  Date: 08/14/2024  Prepared by: Norma Allison  - Standing Terminal Knee Extension with Resistance  - 2 x daily - 7 x weekly - 1 sets - 10 reps  [id number] send additional exercises to her code, pt understood-communicates she has been watching the videos  Date: 07/29/2024  Prepared by: Norma Allison  - Standing Heel Raise with Chair Support  - 2 x daily - 7 x weekly - 1 sets - 15 reps  - Standing Hip Extension with Counter Support  - 2 x daily - 7 x weekly - 1 sets - 15 reps  - Standing Hip Abduction with Counter Support  - 2 x daily - 7 x weekly - 1 sets - 15 reps  - Standing March with Counter Support  - 2 x daily - 7 x weekly - 1 sets - 15 reps  - Sit to Stand  - 2 x daily - 7 x weekly - 1 sets - 8 reps  Date: 07/15/2024  Prepared by: Norma Allison  Exercises  - Supine Bridge  - 2 x daily - 7 x weekly - 1 sets - 10 reps - 5 hold  - Supine Active Straight Leg Raise  - 2 x daily - 7 x weekly - 1 sets - 10 reps  - Supine Piriformis Stretch  - 2 x daily - 7 x weekly - 1 sets - 10 reps - 10 hold  Date: 08/05/2024  Prepared by: Larry  Exercises  - Seated Long Arc Quad  - 1 x daily - 7 x weekly - 2 sets - 10 reps  - Seated Hamstring Curl with Anchored Resistance  - 1 x daily - 7 x weekly - 2 sets - 10 reps  - Seated Hip Abduction with Resistance  - 1 x daily - 7 x weekly - 2 sets - 10 reps  - Seated Hip Adduction Squeeze with Ball  - 1 x daily - 7 x weekly - 2 sets - 10 reps  Comprehension of Education:  [] Verbalizes understanding.  [] Demonstrates understanding.  [] Needs review.  [x] Demonstrates/verbalizes HEP/Ed previously given.     Plan: [x] Continue current frequency toward long and short term goals.    [x] Specific Instructions for subsequent treatments: continue to work on core, LE strengthening, gait mechanics      Time In:  2: 56 pm        Time Out:  3: 51

## 2024-10-14 ENCOUNTER — HOSPITAL ENCOUNTER (OUTPATIENT)
Dept: PHYSICAL THERAPY | Facility: CLINIC | Age: 29
Setting detail: THERAPIES SERIES
Discharge: HOME | End: 2024-10-14
Payer: COMMERCIAL

## 2024-10-14 PROCEDURE — 97110 THERAPEUTIC EXERCISES: CPT

## 2024-10-14 NOTE — FLOWSHEET NOTE
Hamstring Curl with Anchored Resistance  - 1 x daily - 7 x weekly - 2 sets - 10 reps  - Seated Hip Abduction with Resistance  - 1 x daily - 7 x weekly - 2 sets - 10 reps  - Seated Hip Adduction Squeeze with Ball  - 1 x daily - 7 x weekly - 2 sets - 10 reps  Comprehension of Education:  [] Verbalizes understanding.  [] Demonstrates understanding.  [] Needs review.  [x] Demonstrates/verbalizes HEP/Ed previously given.     Plan: [x] Continue current frequency toward long and short term goals.    [x] Specific Instructions for subsequent treatments: continue to work on core, LE strengthening, gait mechanics      Time In:  3:04 pm        Time Out:   3: 58 pm    Electronically signed by:  FANNY ALTMAN PT

## 2024-10-17 ENCOUNTER — HOSPITAL ENCOUNTER (OUTPATIENT)
Dept: PHYSICAL THERAPY | Facility: CLINIC | Age: 29
Setting detail: THERAPIES SERIES
Discharge: HOME | End: 2024-10-17
Payer: COMMERCIAL

## 2024-10-17 PROCEDURE — 97110 THERAPEUTIC EXERCISES: CPT

## 2024-10-17 NOTE — FLOWSHEET NOTE
Continue to address unmet goals above     Patient goals:    less pain, walk better    Pt. Education:  [x] Yes  [] No  [] Reviewed Prior HEP/Ed  Method of Education: [x] Verbal [x] Demo  [] Written   10- encouraged to have a PCP    9/18: sit <> stands (verbal)   9-9-2024 cueing to extend right knee with stance phase of gait  Added sitting toe raises and reissued HEP  Access Code: [email or web address]  URL: [email or web address]  Date: 08/14/2024  Prepared by: Norma Allison  - Standing Terminal Knee Extension with Resistance  - 2 x daily - 7 x weekly - 1 sets - 10 reps  [id number] send additional exercises to her code, pt understood-communicates she has been watching the videos  Date: 07/29/2024  Prepared by: Norma Allison  - Standing Heel Raise with Chair Support  - 2 x daily - 7 x weekly - 1 sets - 15 reps  - Standing Hip Extension with Counter Support  - 2 x daily - 7 x weekly - 1 sets - 15 reps  - Standing Hip Abduction with Counter Support  - 2 x daily - 7 x weekly - 1 sets - 15 reps  - Standing March with Counter Support  - 2 x daily - 7 x weekly - 1 sets - 15 reps  - Sit to Stand  - 2 x daily - 7 x weekly - 1 sets - 8 reps  Date: 07/15/2024  Prepared by: Norma Allison  Exercises  - Supine Bridge  - 2 x daily - 7 x weekly - 1 sets - 10 reps - 5 hold  - Supine Active Straight Leg Raise  - 2 x daily - 7 x weekly - 1 sets - 10 reps  - Supine Piriformis Stretch  - 2 x daily - 7 x weekly - 1 sets - 10 reps - 10 hold  Date: 08/05/2024  Prepared by: Larry  Exercises  - Seated Long Arc Quad  - 1 x daily - 7 x weekly - 2 sets - 10 reps  - Seated Hamstring Curl with Anchored Resistance  - 1 x daily - 7 x weekly - 2 sets - 10 reps  - Seated Hip Abduction with Resistance  - 1 x daily - 7 x weekly - 2 sets - 10 reps  - Seated Hip Adduction Squeeze with Ball  - 1 x daily - 7 x weekly - 2 sets - 10 reps  Comprehension of Education:  [] Verbalizes understanding.  [] Demonstrates understanding.  [] Needs review.  [x]

## 2024-10-21 ENCOUNTER — HOSPITAL ENCOUNTER (OUTPATIENT)
Dept: PHYSICAL THERAPY | Facility: CLINIC | Age: 29
Setting detail: THERAPIES SERIES
Discharge: HOME | End: 2024-10-21
Payer: COMMERCIAL

## 2024-10-21 PROCEDURE — 97110 THERAPEUTIC EXERCISES: CPT

## 2024-10-21 NOTE — FLOWSHEET NOTE
[] [facility]  Outpatient Rehabilitation &  Therapy  [address]  [phone]  [phone] [x] [facility]  Outpatient Rehabilitation &  Therapy  [address]  P: [phone]  F: [phone] [] [facility]  Outpatient Rehabilitation &  Therapy  [address]  P: [phone]  F: [phone]  [] [facility] Outpatient Rehabilitation &  Therapy  [address]  P: [phone]  F: [phone]     Physical Therapy Daily Treatment Note    Date:  10/21/2024  Patient Name:  Jeramie Lainez    :  1995  MRN: [id number]  Physician: MYESHA Adams - IRENE                                 Insurance: [employer]  (30 visits till needs auth)  Medical Diagnosis: M54.50, G89.29 (ICD-10-CM) - Chronic right-sided low back pain without sciatica            Right LE weakness, gait disturbances             Rehab Codes: M54.59,  M25.551, M62.591, Z91.81, R20.2R26.2, R26.89, M79.651  Onset Date: 2024 date of script (ongoing since )                         Next 's appt.: 2024      Used - (for ASL, initial  also added additional  for further clarification) ---  not available until after 8 am  Caprice Paredes (deaf)  Visit# / total visits:  (2 times per week for an additional 8 treatments)  Cancels/No Shows: 1/0    Session ID: [id number]  Language: ASL   ID: #[id number]   Name: Sherry    Subjective:    Pain:  [x] Yes  [] No Location:  Pain Rating: (0-10 scale) -0/10  Pain altered Tx:  [x] No  [] Yes  Action:    Comments: Patient reports no changes over the weekend.     Spinal stenosis   Objective:  Modalities:   Precautions:balance deficits SBA  Exercises:bolded completed  Exercise Reps/ Time Weight/ Level Comments   NuStep  5 min   L4 Scifit Level 2 - 10/17         Supine      bridging 10x  10 sec Difficult

## 2024-10-22 ENCOUNTER — HOSPITAL ENCOUNTER (OUTPATIENT)
Dept: PHYSICAL THERAPY | Facility: CLINIC | Age: 29
Setting detail: THERAPIES SERIES
End: 2024-10-22
Payer: COMMERCIAL

## 2024-10-24 ENCOUNTER — HOSPITAL ENCOUNTER (OUTPATIENT)
Dept: PHYSICAL THERAPY | Facility: CLINIC | Age: 29
Setting detail: THERAPIES SERIES
Discharge: HOME | End: 2024-10-24
Payer: COMMERCIAL

## 2024-10-24 PROCEDURE — 97110 THERAPEUTIC EXERCISES: CPT

## 2024-10-24 NOTE — FLOWSHEET NOTE
Estefany  - Standing Heel Raise with Chair Support  - 2 x daily - 7 x weekly - 1 sets - 15 reps  - Standing Hip Extension with Counter Support  - 2 x daily - 7 x weekly - 1 sets - 15 reps  - Standing Hip Abduction with Counter Support  - 2 x daily - 7 x weekly - 1 sets - 15 reps  - Standing March with Counter Support  - 2 x daily - 7 x weekly - 1 sets - 15 reps  - Sit to Stand  - 2 x daily - 7 x weekly - 1 sets - 8 reps  Date: 07/15/2024  Prepared by: Norma Allison  Exercises  - Supine Bridge  - 2 x daily - 7 x weekly - 1 sets - 10 reps - 5 hold  - Supine Active Straight Leg Raise  - 2 x daily - 7 x weekly - 1 sets - 10 reps  - Supine Piriformis Stretch  - 2 x daily - 7 x weekly - 1 sets - 10 reps - 10 hold  Date: 08/05/2024  Prepared by: Larry  Exercises  - Seated Long Arc Quad  - 1 x daily - 7 x weekly - 2 sets - 10 reps  - Seated Hamstring Curl with Anchored Resistance  - 1 x daily - 7 x weekly - 2 sets - 10 reps  - Seated Hip Abduction with Resistance  - 1 x daily - 7 x weekly - 2 sets - 10 reps  - Seated Hip Adduction Squeeze with Ball  - 1 x daily - 7 x weekly - 2 sets - 10 reps  Comprehension of Education:  [] Verbalizes understanding.  [] Demonstrates understanding.  [] Needs review.  [x] Demonstrates/verbalizes HEP/Ed previously given.     Plan: [x] Continue current frequency toward long and short term goals.    [x] Specific Instructions for subsequent treatments: continue to work on core, LE strengthening, gait mechanics      Time In:  2: 50 pm         Time Out:    3: 43 pm    Electronically signed by:  NORMA ALLISON, PT

## 2024-10-28 ENCOUNTER — HOSPITAL ENCOUNTER (OUTPATIENT)
Dept: PHYSICAL THERAPY | Facility: CLINIC | Age: 29
Setting detail: THERAPIES SERIES
Discharge: HOME | End: 2024-10-28
Payer: COMMERCIAL

## 2024-10-28 ENCOUNTER — APPOINTMENT (OUTPATIENT)
Dept: PHYSICAL THERAPY | Facility: CLINIC | Age: 29
End: 2024-10-28
Payer: COMMERCIAL

## 2024-10-28 PROCEDURE — 97110 THERAPEUTIC EXERCISES: CPT

## 2024-10-28 NOTE — FLOWSHEET NOTE
[] [facility]  Outpatient Rehabilitation &  Therapy  [address]  [phone]  [phone] [x] [facility]  Outpatient Rehabilitation &  Therapy  [address]  P: [phone]  F: [phone] [] [facility]  Outpatient Rehabilitation &  Therapy  [address]  P: [phone]  F: [phone]  [] [facility] Outpatient Rehabilitation &  Therapy  [address]  P: [phone]  F: [phone]     Physical Therapy Daily Treatment Note    Date:  10/28/2024  Patient Name:  Jeramie Lainez    :  1995  MRN: [id number]  Physician: MYESHA Adams - IRENE                                 Insurance: [email or web address]  (30 visits till needs auth)  Medical Diagnosis: M54.50, G89.29 (ICD-10-CM) - Chronic right-sided low back pain without sciatica            Right LE weakness, gait disturbances             Rehab Codes: M54.59,  M25.551, M62.591, Z91.81, R20.2R26.2, R26.89, M79.651  Onset Date: 2024 date of script (ongoing since )                         Next 's appt.: 2024      Used - (for ASL, initial  also added additional  for further clarification) ---  not available until after 8 am  Caprice Paredes (deaf)  Visit# / total visits:  (2 times per week for an additional 8 treatments)  Cancels/No Shows: 1/0    Session ID: [id number]  Language: ASL   ID: #[id number]   Name: Sherry    Subjective:    Pain:  [x] Yes  [] No Location:  Pain Rating: (0-10 scale) -0/10  Pain altered Tx:  [x] No  [] Yes  Action:    Comments: Patient continues to report no pain. Felt fine after last session.     Spinal stenosis   Objective:  Modalities:   Precautions:balance deficits SBA  Exercises:bolded completed  Exercise Reps/ Time Weight/ Level Comments   NuStep  5 min   L4 Scifit Level 2 - 10/17         Supine      bridging

## 2024-10-31 ENCOUNTER — APPOINTMENT (OUTPATIENT)
Dept: PHYSICAL THERAPY | Facility: CLINIC | Age: 29
End: 2024-10-31
Payer: COMMERCIAL

## 2024-10-31 ENCOUNTER — HOSPITAL ENCOUNTER (OUTPATIENT)
Dept: PHYSICAL THERAPY | Facility: CLINIC | Age: 29
Setting detail: THERAPIES SERIES
Discharge: HOME | End: 2024-10-31
Payer: COMMERCIAL

## 2024-10-31 PROCEDURE — 97110 THERAPEUTIC EXERCISES: CPT

## 2024-11-27 ENCOUNTER — TELEPHONE (OUTPATIENT)
Dept: PRIMARY CARE CLINIC | Age: 29
End: 2024-11-27

## 2024-11-27 NOTE — TELEPHONE ENCOUNTER
Called pt to schedule NP appt because of lack of transportation she will call office back to confirm the appt or not due to her transportation

## 2024-12-28 SDOH — HEALTH STABILITY: PHYSICAL HEALTH: ON AVERAGE, HOW MANY DAYS PER WEEK DO YOU ENGAGE IN MODERATE TO STRENUOUS EXERCISE (LIKE A BRISK WALK)?: 7 DAYS

## 2024-12-28 SDOH — HEALTH STABILITY: PHYSICAL HEALTH: ON AVERAGE, HOW MANY MINUTES DO YOU ENGAGE IN EXERCISE AT THIS LEVEL?: 30 MIN

## 2024-12-31 ENCOUNTER — OFFICE VISIT (OUTPATIENT)
Dept: PRIMARY CARE CLINIC | Age: 29
End: 2024-12-31
Payer: COMMERCIAL

## 2024-12-31 VITALS
DIASTOLIC BLOOD PRESSURE: 81 MMHG | SYSTOLIC BLOOD PRESSURE: 117 MMHG | TEMPERATURE: 97.6 F | HEIGHT: 63 IN | HEART RATE: 97 BPM | OXYGEN SATURATION: 100 % | WEIGHT: 183 LBS | BODY MASS INDEX: 32.43 KG/M2

## 2024-12-31 DIAGNOSIS — G89.29 CHRONIC RIGHT-SIDED LOW BACK PAIN WITH RIGHT-SIDED SCIATICA: ICD-10-CM

## 2024-12-31 DIAGNOSIS — M54.41 CHRONIC RIGHT-SIDED LOW BACK PAIN WITH RIGHT-SIDED SCIATICA: ICD-10-CM

## 2024-12-31 DIAGNOSIS — R26.9 GAIT ABNORMALITY: Primary | ICD-10-CM

## 2024-12-31 PROCEDURE — 99203 OFFICE O/P NEW LOW 30 MIN: CPT | Performed by: NURSE PRACTITIONER

## 2024-12-31 SDOH — ECONOMIC STABILITY: FOOD INSECURITY: WITHIN THE PAST 12 MONTHS, YOU WORRIED THAT YOUR FOOD WOULD RUN OUT BEFORE YOU GOT MONEY TO BUY MORE.: NEVER TRUE

## 2024-12-31 SDOH — ECONOMIC STABILITY: FOOD INSECURITY: WITHIN THE PAST 12 MONTHS, THE FOOD YOU BOUGHT JUST DIDN'T LAST AND YOU DIDN'T HAVE MONEY TO GET MORE.: NEVER TRUE

## 2024-12-31 SDOH — ECONOMIC STABILITY: INCOME INSECURITY: HOW HARD IS IT FOR YOU TO PAY FOR THE VERY BASICS LIKE FOOD, HOUSING, MEDICAL CARE, AND HEATING?: SOMEWHAT HARD

## 2024-12-31 ASSESSMENT — ANXIETY QUESTIONNAIRES
5. BEING SO RESTLESS THAT IT IS HARD TO SIT STILL: NOT AT ALL
GAD7 TOTAL SCORE: 0
2. NOT BEING ABLE TO STOP OR CONTROL WORRYING: NOT AT ALL
6. BECOMING EASILY ANNOYED OR IRRITABLE: NOT AT ALL
4. TROUBLE RELAXING: NOT AT ALL
1. FEELING NERVOUS, ANXIOUS, OR ON EDGE: NOT AT ALL
7. FEELING AFRAID AS IF SOMETHING AWFUL MIGHT HAPPEN: NOT AT ALL
3. WORRYING TOO MUCH ABOUT DIFFERENT THINGS: NOT AT ALL

## 2024-12-31 ASSESSMENT — PATIENT HEALTH QUESTIONNAIRE - PHQ9
1. LITTLE INTEREST OR PLEASURE IN DOING THINGS: NOT AT ALL
2. FEELING DOWN, DEPRESSED OR HOPELESS: NOT AT ALL
SUM OF ALL RESPONSES TO PHQ QUESTIONS 1-9: 0
SUM OF ALL RESPONSES TO PHQ9 QUESTIONS 1 & 2: 0
SUM OF ALL RESPONSES TO PHQ QUESTIONS 1-9: 0

## 2024-12-31 NOTE — PROGRESS NOTES
Jeramie Lainez (:  1995) is a 29 y.o. female,New patient, here for evaluation of the following chief complaint(s):  Annual Exam    Jeramie Lainez is a 29-year-old female here today to establish care.  The patient is deaf, visit completed with audiovisual  service.       Assessment & Plan  Gait abnormality    Reviewed previous imaging, patient did undergo CT of the cervical thoracic and lumbar spine.  X-rays no evidence of scoliosis.  I encouraged patient to schedule and complete CT of the head given concerns of foot drag without clear evidence of cord compression on CT myelograms.    Orders:    XR HIP 3-4 VW W PELVIS BILATERAL; Future    Chronic right-sided low back pain with right-sided sciatica       Orders:    XR HIP 3-4 VW W PELVIS BILATERAL; Future    Upon further chart review after visit completed, provider found an incidental finding of right thyroid lobe hypodensity, 17 mm.  Will evaluate further with thyroid ultrasound and discuss at 3-month follow-up with patient    Return in about 3 months (around 3/31/2025) for physical.       Subjective   Jeramie Lainez is a 29-year-old female here today to establish care.    She states she has been following with neurosurgery and physical therapy this last year, starting in April.  Ongoing 5-year issue with right leg pain and right knee pain, with difficulty walking.  She endorses ongoing issues with feeling \"off balance\".  Specifically feels like her hips are off and that 1 is higher than the other, causing issues with her walking.  That she often feels tilted when she is moving.  She had no prior surgeries to her hips or back.  She does have a cochlear implant in place, cannot have an MRI.  The patient recalls having a CT of the head ordered by neurosurgery but has not yet scheduled the testing.    Family medical history reviewed, no pertinent medical history aside from chronic back pain.  No prior surgeries aside from the cochlear

## 2025-03-03 ENCOUNTER — OFFICE VISIT (OUTPATIENT)
Dept: NEUROLOGY | Age: 30
End: 2025-03-03

## 2025-03-03 VITALS
HEART RATE: 98 BPM | OXYGEN SATURATION: 100 % | HEIGHT: 63 IN | BODY MASS INDEX: 31.71 KG/M2 | WEIGHT: 179 LBS | SYSTOLIC BLOOD PRESSURE: 123 MMHG | DIASTOLIC BLOOD PRESSURE: 75 MMHG

## 2025-03-03 DIAGNOSIS — R27.0 ATAXIA: Primary | ICD-10-CM

## 2025-03-03 DIAGNOSIS — R25.8 CLONUS: ICD-10-CM

## 2025-03-03 NOTE — PROGRESS NOTES
[address]  [facility] [address]  Dept: [phone]  Dept Fax: [phone]    NEUROLOGY OUTPATIENT NOTE       PATIENT NAME: Jeramie Lainez  PATIENT MRN: [id number]  PRIMARY CARE PHYSICIAN: Norma Lance, APRN - CNP      HPI:      Of note, patient history is limited    Jeramie Lainez is a 29 y.o. female who is nonverbal and uses sign language. She has been complaining of gait difficulty hip pain and imbalance since 2019/2020. This started suddenly as she woke up from bed. Currently she uses walker.    She underwent CT myelogram without sig disease findings. She cannot get MRI brain or spine as due to cochlear implants. She is pending CTH WO contrast.    Patient symptoms have not sig improved nor worsened over the past years, she persistently still uses walker 2/2 notable ataxia on gait. There is no dysmetria appreciated today. Patient was referred due to concerns of a mild nonsustained clonus on R L extremity.    Patient denies, headaches, seizure, blurry vision (except on going to sunlight), no facial asymmetry and no recent traumas or traumas preceding that onset.     PREVIOUS WORKUP:     No results found for: \"WBC\", \"HGB\", \"HCT\", \"MCV\", \"PLT\"    Past Medical History:   Diagnosis Date    Deaf     Lower back pain         Past Surgical History:   Procedure Laterality Date    COCHLEAR IMPLANT Left         Social History     Socioeconomic History    Marital status: Single     Spouse name: Not on file    Number of children: Not on file    Years of education: Not on file    Highest education level: Not on file   Occupational History    Not on file   Tobacco Use    Smoking status: Never    Smokeless tobacco: Never   Vaping Use    Vaping status: Never Used   Substance and Sexual Activity    Alcohol use: Never    Drug use: Yes     Types: Marijuana (Weed)    Sexual activity: Defer     Partners: Male     Birth control/protection:

## 2025-03-06 ENCOUNTER — TELEPHONE (OUTPATIENT)
Dept: INTERVENTIONAL RADIOLOGY/VASCULAR | Age: 30
End: 2025-03-06

## 2025-03-06 NOTE — TELEPHONE ENCOUNTER
Attempted to schedule pt for lumbar puncture. LM advising pt via  to contact [phone] to schedule.

## 2025-03-07 SDOH — ECONOMIC STABILITY: INCOME INSECURITY: IN THE LAST 12 MONTHS, WAS THERE A TIME WHEN YOU WERE NOT ABLE TO PAY THE MORTGAGE OR RENT ON TIME?: NO

## 2025-03-07 SDOH — ECONOMIC STABILITY: TRANSPORTATION INSECURITY
IN THE PAST 12 MONTHS, HAS LACK OF TRANSPORTATION KEPT YOU FROM MEETINGS, WORK, OR FROM GETTING THINGS NEEDED FOR DAILY LIVING?: NO

## 2025-03-07 SDOH — ECONOMIC STABILITY: FOOD INSECURITY: WITHIN THE PAST 12 MONTHS, THE FOOD YOU BOUGHT JUST DIDN'T LAST AND YOU DIDN'T HAVE MONEY TO GET MORE.: SOMETIMES TRUE

## 2025-03-07 SDOH — ECONOMIC STABILITY: FOOD INSECURITY: WITHIN THE PAST 12 MONTHS, YOU WORRIED THAT YOUR FOOD WOULD RUN OUT BEFORE YOU GOT MONEY TO BUY MORE.: SOMETIMES TRUE

## 2025-03-07 SDOH — ECONOMIC STABILITY: TRANSPORTATION INSECURITY
IN THE PAST 12 MONTHS, HAS THE LACK OF TRANSPORTATION KEPT YOU FROM MEDICAL APPOINTMENTS OR FROM GETTING MEDICATIONS?: NO

## 2025-03-07 ASSESSMENT — PATIENT HEALTH QUESTIONNAIRE - PHQ9
2. FEELING DOWN, DEPRESSED OR HOPELESS: NOT AT ALL
SUM OF ALL RESPONSES TO PHQ QUESTIONS 1-9: 0
SUM OF ALL RESPONSES TO PHQ9 QUESTIONS 1 & 2: 0
2. FEELING DOWN, DEPRESSED OR HOPELESS: NOT AT ALL
1. LITTLE INTEREST OR PLEASURE IN DOING THINGS: NOT AT ALL
SUM OF ALL RESPONSES TO PHQ QUESTIONS 1-9: 0
1. LITTLE INTEREST OR PLEASURE IN DOING THINGS: NOT AT ALL
SUM OF ALL RESPONSES TO PHQ QUESTIONS 1-9: 0
SUM OF ALL RESPONSES TO PHQ QUESTIONS 1-9: 0

## 2025-03-10 ENCOUNTER — OFFICE VISIT (OUTPATIENT)
Dept: PRIMARY CARE CLINIC | Age: 30
End: 2025-03-10
Payer: COMMERCIAL

## 2025-03-10 VITALS
BODY MASS INDEX: 35.07 KG/M2 | DIASTOLIC BLOOD PRESSURE: 75 MMHG | OXYGEN SATURATION: 100 % | WEIGHT: 198 LBS | HEART RATE: 95 BPM | SYSTOLIC BLOOD PRESSURE: 115 MMHG | TEMPERATURE: 97.9 F

## 2025-03-10 DIAGNOSIS — Z76.89 REFERRAL OF PATIENT: ICD-10-CM

## 2025-03-10 DIAGNOSIS — E04.1 THYROID NODULE GREATER THAN OR EQUAL TO 1.5 CM IN DIAMETER INCIDENTALLY NOTED ON IMAGING STUDY: Primary | ICD-10-CM

## 2025-03-10 PROCEDURE — 99213 OFFICE O/P EST LOW 20 MIN: CPT | Performed by: NURSE PRACTITIONER

## 2025-03-10 NOTE — PROGRESS NOTES
[address] [facility] MAIN [facility] [address]   3/10/2025    Jeramie Lainez is a 29 y.o. female who presents today for her medical conditions and/or complaints as noted below.    Jeramie Lainez is scheduled today for 3 Month Follow-Up  .      HPI:     History of Present Illness  The patient is a 29-year-old female who presents today for a routine follow-up. She was last seen on 12/31/2024 to establish care. The patient was seen as a new patient by neurology on 03/03/2025. She is deaf, and the visit is being completed with an audiovisual  using American Sign Language.    She is in need of a thyroid ultrasound due to an incidental finding of a 17 mm hypodensity on the right thyroid lobe. She reports no issues with choking on food or difficulty swallowing, except when consuming food at a rapid pace.    During the neurology visit, she was seen for gait difficulty and imbalance. She does ambulate with a walker and can not undergo MRI due to a cochlear implant. EMG as well as lumbar puncture were ordered, and she was referred to physical therapy. She had a consultation with the neurologist last week regarding her lower extremity issues. A lumbar puncture has been scheduled for 03/17/2025, and she is set to commence physical therapy on 03/12/2025. She has been experiencing hip pain and discomfort. She has been experiencing memory loss following an accident.    She does not currently have an OB/GYN provider, with her last pelvic examination conducted approximately 2 to 3 years ago.      Vitals:    03/10/25 1228   BP: 115/75   BP Site: Right Upper Arm   Patient Position: Sitting   BP Cuff Size: Medium Adult   Pulse: 95   Temp: 97.9 °F (36.6 °C)   TempSrc: Temporal   SpO2: 100%   Weight: 89.8 kg (198 lb)      Past Medical History:   Diagnosis Date    Deaf     Lower back pain       Past Surgical History:   Procedure Laterality Date    COCHLEAR IMPLANT Left      Family History

## 2025-03-12 ENCOUNTER — HOSPITAL ENCOUNTER (OUTPATIENT)
Age: 30
Setting detail: THERAPIES SERIES
Discharge: HOME | End: 2025-03-12
Payer: COMMERCIAL

## 2025-03-12 PROCEDURE — 97162 PT EVAL MOD COMPLEX 30 MIN: CPT | Performed by: PHYSICAL THERAPIST

## 2025-03-12 PROCEDURE — 97116 GAIT TRAINING THERAPY: CPT | Performed by: PHYSICAL THERAPIST

## 2025-03-12 NOTE — CONSULTS
Session ID: [id number]  Language: ASL   ID: #[id number]   Name: Juana        [x] [facility]  Outpatient Rehabilitation &  Therapy  [address]    [phone]  F: [phone] [] [facility]  Outpatient Rehabilitation &  Therapy  [address]   [address]  P: [phone]  F: [phone] [] [facility]  Outpatient Rehabilitation &  Therapy  [address]  P: [phone]  F: [phone] [] [facility]  Outpatient Rehabilitation &  Therapy  [address]   P: [phone]  F: [phone] [] [facility]   Outpatient Rehabilitation & Therapy  [address]  P: [phone]   F: [phone]        Physical Therapy Neurological Evaluation    Date:  3/12/2025  Patient: Jeramie Lainez  : 1995  MRN: [id number]  Physician: MYESHA Chavez-CNP     Insurance: [employer] (30 vs)  Medical Diagnosis: R27.0 (ICD-10-CM) - Ataxia   Rehab Codes: ***  Next 's appt.: 25 neuro  Date of symptom onset: ***    Subjective:   CC: Pt arrives for physical therapy evaluation of impaired gait/balance, atraumatic onset ~2018 - initial symptoms included severe pain on right hip and knee (causing knee to give out with walking), difficulty standing up and walking. Notes she did have some initial falls in 2018 and then it became progressive, lasting ~2 years. Did improve somewhat,   Falls occur daily, ~10x or more, can occur when getting up quickly from sitting (notes she can also urinate when this occurs). Incontinence occurs daily, when going from standing but also when sleeping. Reports intermittent numbness in R upper leg. States she believes her falls often come from the numbness/not knowing where her leg ism especially after sitting for awhile and then having to move quickly to the restroom. Using rolling walker daily since last year, was

## 2025-03-17 ENCOUNTER — HOSPITAL ENCOUNTER (OUTPATIENT)
Dept: INTERVENTIONAL RADIOLOGY/VASCULAR | Age: 30
Discharge: HOME | End: 2025-03-19
Payer: COMMERCIAL

## 2025-03-17 VITALS
SYSTOLIC BLOOD PRESSURE: 112 MMHG | OXYGEN SATURATION: 100 % | TEMPERATURE: 97.7 F | HEART RATE: 60 BPM | DIASTOLIC BLOOD PRESSURE: 76 MMHG | RESPIRATION RATE: 16 BRPM

## 2025-03-17 DIAGNOSIS — E04.1 THYROID NODULE GREATER THAN OR EQUAL TO 1.5 CM IN DIAMETER INCIDENTALLY NOTED ON IMAGING STUDY: ICD-10-CM

## 2025-03-17 DIAGNOSIS — R27.0 ATAXIA: ICD-10-CM

## 2025-03-17 LAB
ALBUMIN CSF-MCNC: 321 MG/L (ref 100–300)
ALBUMIN INDEX: 74.7
ALBUMIN SERPL-MCNC: 4.3 G/DL (ref 3.5–5.2)
APPEARANCE CSF: CLEAR
CLOT CHECK: NORMAL
COLOR CSF: COLORLESS
HCG SERPL QL: NEGATIVE
IGG CSF-MCNC: 13 MG/DL (ref 1–3)
IGG INDEX CSF: 1.16
IGG SERPL-MCNC: 1496 MG/DL (ref 700–1600)
IGG SYNTHESIS RATE CSF: 34.7 MG/24 H
OLIGOCLONAL BANDS: ABNORMAL
RBC # FLD MANUAL: 0 CELLS/UL
SPECIMEN VOL CSF: 10 ML
TOTAL CELLS COUNTED BRONCH: 4 CELLS/UL (ref 0–5)
TUBE # CSF: 3
XANTHOCHROMIA CSF QL: NORMAL

## 2025-03-17 PROCEDURE — 2580000003 HC RX 258: Performed by: PHYSICIAN ASSISTANT

## 2025-03-17 PROCEDURE — 62328 DX LMBR SPI PNXR W/FLUOR/CT: CPT

## 2025-03-17 PROCEDURE — 7100000041 HC SPAR PHASE II RECOVERY - ADDTL 15 MIN

## 2025-03-17 PROCEDURE — 83916 OLIGOCLONAL BANDS: CPT

## 2025-03-17 PROCEDURE — 82784 ASSAY IGA/IGD/IGG/IGM EACH: CPT

## 2025-03-17 PROCEDURE — 89050 BODY FLUID CELL COUNT: CPT

## 2025-03-17 PROCEDURE — 82042 OTHER SOURCE ALBUMIN QUAN EA: CPT

## 2025-03-17 PROCEDURE — 7100000040 HC SPAR PHASE II RECOVERY - FIRST 15 MIN

## 2025-03-17 PROCEDURE — 84703 CHORIONIC GONADOTROPIN ASSAY: CPT

## 2025-03-17 PROCEDURE — 82040 ASSAY OF SERUM ALBUMIN: CPT

## 2025-03-17 RX ORDER — SODIUM CHLORIDE 9 MG/ML
INJECTION, SOLUTION INTRAVENOUS CONTINUOUS
Status: DISCONTINUED | OUTPATIENT
Start: 2025-03-17 | End: 2025-03-20 | Stop reason: HOSPADM

## 2025-03-17 RX ADMIN — SODIUM CHLORIDE: 9 INJECTION, SOLUTION INTRAVENOUS at 13:15

## 2025-03-17 ASSESSMENT — PAIN SCALES - GENERAL
PAINLEVEL_OUTOF10: 0

## 2025-03-17 NOTE — BRIEF OP NOTE
Brief Postoperative Note Lumbar Puncture    Jeramie Lainez  YOB: 1995  [id number]    Pre-operative Diagnosis: Ataxia     Post-operative Diagnosis: Same    Procedure: Fluoro guided Lumbar Puncture    Anesthesia: 1% Lidocaine    Surgeons/Assistants: Narciso Teresa PA-C    Complications: None    EBL: Minimal    Specimens: Obtained and sent to lab    Fluoroscopy guided lumbar puncture. 20 gauge spinal needle. L3-L4. 10 ml clear CSF obtained.  Dressing applied. Instructions given.    Electronically signed by KIRBY Be on 3/17/2025 at 3:44 PM

## 2025-03-17 NOTE — PROGRESS NOTES
Here for LP. Consent obtained. ACL interpretor Vishal number [id number]. Narciso ORR present. BAck is prepped, draped and numbed. Access obtained and 10 ml of clear fluid off. Access out and band aid to back. Tolerated well. Specimen will be sent.

## 2025-03-17 NOTE — H&P
History and Physical    Pt Name: Jeramie Lainez  MRN: [id number]  YOB: 1995  Date of evaluation: 3/17/2025  Primary Care Physician: Norma Lance, MYESHA - CNP    SUBJECTIVE:   History of Chief Complaint:    Jeramie Lainez is a 29 y.o. female who is scheduled today for lumbar puncture. Interview completed with assistance from , Dominguez, ID# [id number]. Patient states she has been having lower back pain for a long time. She denies any numbness or tingling. She has never had back surgery. Patient has a history of ataxia.   Allergies  has no known allergies.  Medications  Prior to Admission medications    Not on File     Past Medical History    has a past medical history of Ataxia, Deaf, and Lower back pain.  Past Surgical History   has a past surgical history that includes Cochlear implant (Left).  Social History   reports that she has never smoked. She has never used smokeless tobacco.   reports no history of alcohol use.   reports current drug use. Drug: Marijuana (Weed).  Marital Status single (fiance)  Family History  Family Status   Relation Name Status    Mother  Alive    Father      Sister  Alive    Brother  Alive    Other  (Not Specified)   No partnership data on file     family history includes Breast Cancer in an other family member; Diabetes in an other family member; Fibromyalgia in her mother; Hypertension in an other family member; Mult Sclerosis in an other family member.    Review of Systems:  CONSTITUTIONAL:   negative for fevers, chills, fatigue and malaise    EYES:   negative for double vision, blurred vision and photophobia    HEENT:   negative for tinnitus, epistaxis and sore throat     RESPIRATORY:   negative for cough, shortness of breath, wheezing     CARDIOVASCULAR:   negative for chest pain, palpitations, syncope, edema     GASTROINTESTINAL:   negative for nausea, vomiting     GENITOURINARY:   negative for incontinence     MUSCULOSKELETAL:

## 2025-03-19 LAB
CSF ISOELECTRIC FOCUSING INTERPRETATION: ABNORMAL
OLIGOCLONAL BANDS CSF IEF: 17 BANDS (ref 0–1)
OLIGOCLONAL BANDS: POSITIVE

## 2025-03-21 DIAGNOSIS — R26.9 GAIT ABNORMALITY: ICD-10-CM

## 2025-03-21 DIAGNOSIS — M21.371 FOOT DROP, RIGHT: Primary | ICD-10-CM

## 2025-03-24 ENCOUNTER — HOSPITAL ENCOUNTER (OUTPATIENT)
Age: 30
Setting detail: THERAPIES SERIES
Discharge: HOME | End: 2025-03-24
Payer: COMMERCIAL

## 2025-03-24 PROCEDURE — 97112 NEUROMUSCULAR REEDUCATION: CPT | Performed by: PHYSICAL THERAPIST

## 2025-03-24 PROCEDURE — 97110 THERAPEUTIC EXERCISES: CPT | Performed by: PHYSICAL THERAPIST

## 2025-03-24 NOTE — FLOWSHEET NOTE
CHEATHAM BALANCE SCALE 14-Item Long Form Original Version    Patient Name:  Jeramie Lainez  Date:  3/24/2025    1. SITTING TO STANDING  INSTRUCTIONS: Please stand up. Try not to use your hands for support.  (4) able to stand without using hands and stabilize independently  (3) able to stand independently using hands  (2) able to stand using hands after several tries  (1) needs minimal aid to stand or to stabilize  (0) needs moderate or maximal assist to stand  Score: 3    2. STANDING UNSUPPORTED  INSTRUCTIONS: Please stand for two minutes without holding.  (4) able to stand safely 2 minutes  (3) able to stand 2 minutes with supervision  (2) able to stand 30 seconds unsupported  (1) needs several tries to stand 30 seconds unsupported  (0) unable to stand 30 seconds unassisted If a subject is able to stand 2  minutes unsupported, score full points for sitting unsupported. Proceed to  item #4.  Score: 3    3. SITTING WITH BACK UNSUPPORTED BUT FEET SUPPORTED  ON FLOOR OR ON A STOOL  INSTRUCTIONS: Please sit with arms folded for 2 minutes.  (4) able to sit safely and securely 2 minutes  (3) able to sit 2 minutes under supervision  (2) able to sit 30 seconds  (1) able to sit 10 seconds  (0) unable to sit without support 10 seconds  Score: 4     4. STANDING TO SITTING  INSTRUCTIONS: Please sit down.  (4) sits safely with minimal use of hands  (3) controls descent by using hands  (2) uses back of legs against chair to control descent  (1) sits independently but has uncontrolled descent  (0) needs assistance to sit  Score: 3    5. TRANSFERS  INSTRUCTIONS: Arrange chairs(s) for a pivot transfer. Ask subject to  transfer one way toward a seat with armrests and one way toward a seat  without armrests. You may use two chairs (one with and one without  armrests) or a bed and a chair.  (4) able to transfer safely with minor use of hands  (3) able to transfer safely definite need of hands  (2) able to transfer with verbal cueing

## 2025-03-24 NOTE — FLOWSHEET NOTE
[x] [facility]  Outpatient Rehabilitation &  Therapy  [address]  [phone]  [phone] [] [facility]  Outpatient Rehabilitation &  Therapy  [address]  P: [phone]  F: [phone] [] [facility]  Outpatient Rehabilitation &  Therapy  [address]  P: [phone]  F: [phone] [] [facility]  Outpatient Rehabilitation &  Therapy  [address]  [phone]  [phone] [] [facility]  Outpatient Rehabilitation &  Therapy  [address]   P: [phone]  F: [phone]  [] [facility]  Outpatient Rehabilitation &  Therapy  [address]  P: [phone]  F: [phone] [] [facility]  Outpatient Rehabilitation &  Therapy  [address]  P: [phone]  F: [phone] [] [facility]  Outpatient Rehabilitation &  Therapy  [address]  P: [phone]  F: [phone] [] [facility]  Outpatient Rehabilitation &  Therapy  [address]  P: [phone]  F: [phone]  [] [facility] Outpatient Rehabilitation &  Therapy  [address]  P: [phone]  F: [phone]     Physical Therapy Daily Treatment Note    Date:  3/24/2025  Patient Name:  Jeramie aLinez    :  1995  MRN: [id number]  Physician: MYESHA Chavez-IRENE                                Insurance: [employer] (30 vs)  Medical Diagnosis: R27.0 (ICD-10-CM) - Ataxia   Rehab Codes: R26.89, M62.81, R29.3  Next 's appt.: 25 neuro  Date of symptom onset: 3/3/25 (chronic onset so used date of referral)  Visit# / total visits:     Cancels/No Shows: 0    Subjective:    Pain:  [] Yes  [x] No Location:  N/A Pain Rating: (0-10 scale) none reported/10  Pain altered Tx:  [x] No  [] Yes  Action:  Comments: Pt arrives

## 2025-03-28 ENCOUNTER — HOSPITAL ENCOUNTER (OUTPATIENT)
Age: 30
Setting detail: THERAPIES SERIES
Discharge: HOME | End: 2025-03-28
Payer: COMMERCIAL

## 2025-03-28 PROCEDURE — 97110 THERAPEUTIC EXERCISES: CPT

## 2025-03-28 NOTE — FLOWSHEET NOTE
[x] [facility]  Outpatient Rehabilitation &  Therapy  [address]  [phone]  [phone] [] [facility]  Outpatient Rehabilitation &  Therapy  [address]  P: [phone]  F: [phone] [] [facility]  Outpatient Rehabilitation &  Therapy  [address]  P: [phone]  F: [phone] [] [facility]  Outpatient Rehabilitation &  Therapy  [address]  [phone]  [phone] [] [facility]  Outpatient Rehabilitation &  Therapy  [address]   P: [phone]  F: [phone]  [] [facility]  Outpatient Rehabilitation &  Therapy  [address]  P: [phone]  F: [phone] [] [facility]  Outpatient Rehabilitation &  Therapy  [address]  P: [phone]  F: [phone] [] [facility]  Outpatient Rehabilitation &  Therapy  [address]  P: [phone]  F: [phone] [] [facility]  Outpatient Rehabilitation &  Therapy  [address]  P: [phone]  F: [phone]  [] [facility] Outpatient Rehabilitation &  Therapy  [address]  P: [phone]  F: [phone]     Physical Therapy Daily Treatment Note    Date:  3/28/2025  Patient Name:  Jeramie Lainez    :  1995  MRN: [id number]  Physician: MYESHA Chavez-IRENE                                Insurance: [employer] (30 vs)  Medical Diagnosis: R27.0 (ICD-10-CM) - Ataxia   Rehab Codes: R26.89, M62.81, R29.3  Next 's appt.: 25 neuro  Date of symptom onset: 3/3/25 (chronic onset so used date of referral)  Visit# / total visits: 3/16    Cancels/No Shows: 0    Subjective:    Pain:  [] Yes  [x] No Location:  N/A Pain Rating: (0-10 scale) none reported/10  Pain altered Tx:  [x] No  [] Yes  Action:  Comments: Pt reports

## 2025-03-31 ENCOUNTER — HOSPITAL ENCOUNTER (OUTPATIENT)
Age: 30
Setting detail: THERAPIES SERIES
Discharge: HOME | End: 2025-03-31
Payer: COMMERCIAL

## 2025-03-31 PROCEDURE — 97116 GAIT TRAINING THERAPY: CPT | Performed by: PHYSICAL THERAPIST

## 2025-03-31 PROCEDURE — 97110 THERAPEUTIC EXERCISES: CPT | Performed by: PHYSICAL THERAPIST

## 2025-03-31 NOTE — FLOWSHEET NOTE
[x] [facility]  Outpatient Rehabilitation &  Therapy  [address]  [phone]  [phone] [] [facility]  Outpatient Rehabilitation &  Therapy  [address]  P: [phone]  F: [phone] [] [facility]  Outpatient Rehabilitation &  Therapy  [address]  P: [phone]  F: [phone] [] [facility]  Outpatient Rehabilitation &  Therapy  [address]  [phone]  [phone] [] [facility]  Outpatient Rehabilitation &  Therapy  [address]   P: [phone]  F: [phone]  [] [facility]  Outpatient Rehabilitation &  Therapy  [address]  P: [phone]  F: [phone] [] [facility]  Outpatient Rehabilitation &  Therapy  [address]  P: [phone]  F: [phone] [] [facility]  Outpatient Rehabilitation &  Therapy  [address]  P: [phone]  F: [phone] [] [facility]  Outpatient Rehabilitation &  Therapy  [address]  P: [phone]  F: [phone]  [] [facility] Outpatient Rehabilitation &  Therapy  [address]  P: [phone]  F: [phone]     Physical Therapy Daily Treatment Note    Date:  3/31/2025  Patient Name:  Jeramie Lainez   \"YA YA\" :  1995  MRN: [id number]  Physician: MYESHA Chavez-IRENE                                Insurance: [facility] Medicaid (30 vs)  Medical Diagnosis: R27.0 (ICD-10-CM) - Ataxia   Rehab Codes: R26.89, M62.81, R29.3  Next 's appt.: 25 neuro  Date of symptom onset: 3/3/25 (chronic onset so used date of referral)  Visit# / total visits:     Cancels/No Shows: 0    Subjective:    Pain:  [] Yes  [x] No Location:  N/A Pain Rating: (0-10 scale) none reported/10  Pain altered Tx:  [x] No  [] Yes  Action:  Comments: Pt to

## 2025-04-04 ENCOUNTER — HOSPITAL ENCOUNTER (OUTPATIENT)
Age: 30
Setting detail: THERAPIES SERIES
Discharge: HOME | End: 2025-04-04
Payer: COMMERCIAL

## 2025-04-04 PROCEDURE — 97110 THERAPEUTIC EXERCISES: CPT

## 2025-04-04 NOTE — FLOWSHEET NOTE
[x] [facility]  Outpatient Rehabilitation &  Therapy  [address]  [phone]  [phone] [] [facility]  Outpatient Rehabilitation &  Therapy  [address]  P: [phone]  F: [phone] [] [facility]  Outpatient Rehabilitation &  Therapy  [address]  P: [phone]  F: [phone] [] [facility]  Outpatient Rehabilitation &  Therapy  [address]  [phone]  [phone] [] [facility]  Outpatient Rehabilitation &  Therapy  [address]   P: [phone]  F: [phone]  [] [facility]  Outpatient Rehabilitation &  Therapy  [address]  P: [phone]  F: [phone] [] [facility]  Outpatient Rehabilitation &  Therapy  [address]  P: [phone]  F: [phone] [] [facility]  Outpatient Rehabilitation &  Therapy  [address]  P: [phone]  F: [phone] [] [facility]  Outpatient Rehabilitation &  Therapy  [address]  P: [phone]  F: [phone]  [] [facility] Outpatient Rehabilitation &  Therapy  [address]  P: [phone]  F: [phone]     Physical Therapy Daily Treatment Note    Date:  2025  Patient Name:  Jeramie Lainez   \"YA YA\" :  1995  MRN: [id number]  Physician: MYESHA Chavez-IRENE                                Insurance: [facility] Medicaid (30 vs)  Medical Diagnosis: R27.0 (ICD-10-CM) - Ataxia   Rehab Codes: R26.89, M62.81, R29.3  Next 's appt.: 25 neuro  Date of symptom onset: 3/3/25 (chronic onset so used date of referral)  Visit# / total visits:     Cancels/No Shows: 0    Subjective:    Pain:  [] Yes  [x] No Location:  N/A Pain Rating: (0-10 scale) none reported/10  Pain altered Tx:  [x] No  [] Yes  Action:  Comments: Patient

## 2025-04-07 ENCOUNTER — HOSPITAL ENCOUNTER (OUTPATIENT)
Age: 30
Setting detail: THERAPIES SERIES
Discharge: HOME | End: 2025-04-07
Payer: COMMERCIAL

## 2025-04-07 PROCEDURE — 97116 GAIT TRAINING THERAPY: CPT | Performed by: PHYSICAL THERAPIST

## 2025-04-07 PROCEDURE — 97112 NEUROMUSCULAR REEDUCATION: CPT | Performed by: PHYSICAL THERAPIST

## 2025-04-07 NOTE — FLOWSHEET NOTE
hip/ankle musculature to indicate further improving strength for prolonged gait, stair climbing  ? Function:  Pt able to ambulate at least 500 ft consecutively with .75m/s using LRAD to indicate further improving gait endurance and speed  Pt able to ascend/descend flight of stairs with unilat UE assist using reciprocal pattern with good safety  Pt able to complete floor transfers with UE assist independently and safely  6mWT goal to be created once administered.  ABC scale to at least 30% confidence to indicate slowly improving balance confidence since beginning PT        Patient goals: better balance    Pt. Education:  [x] Yes  [] No  [x] Reviewed Prior HEP/Ed  Method of Education: [x] Verbal  [x] Demo  [] Written  Comprehension of Education:  [x] Verbalizes understanding.  [x] Demonstrates understanding.  [] Needs review.  [] Demonstrates/verbalizes HEP/Ed previously given.    Access Code: [email or web address]  URL: [email or web address]  Date: 03/24/2025  Prepared by: Tara Guerra    Exercises  - Staggered Sit-to-Stand  - 1 x daily - 7 x weekly - 3 sets - 5 reps  - Gastroc Stretch on Wall (Mirrored)  - 1 x daily - 7 x weekly - 3 sets - 20 sec hold  - Forward Step Touch  - 1 x daily - 7 x weekly - 3 sets - 10 reps       Access Code: [email or web address]  URL: [email or web address]  Date: 03/28/2025  Prepared by: Eboni Colby    Exercises  - Gastroc Stretch on Wall  - 1 x daily - 7 x weekly - 5 sets - 20 hold  - Supine Bridge  - 1 x daily - 7 x weekly - 2 sets - 10 reps      Plan: [x] Continue current frequency toward long and short term goals.    [x] Specific Instructions for subsequent treatments: progress Leonard balance tasks, walking balance, moderate intensity exercise      Time In: 3:05 pm            Time Out: 4:00 pm    Electronically signed by:  Tara Guerra, PT

## 2025-04-11 ENCOUNTER — HOSPITAL ENCOUNTER (OUTPATIENT)
Age: 30
Setting detail: THERAPIES SERIES
Discharge: HOME | End: 2025-04-11
Payer: COMMERCIAL

## 2025-04-11 PROCEDURE — 97116 GAIT TRAINING THERAPY: CPT

## 2025-04-11 PROCEDURE — 97110 THERAPEUTIC EXERCISES: CPT

## 2025-04-11 NOTE — FLOWSHEET NOTE
[x] [facility]  Outpatient Rehabilitation &  Therapy  [address]  [phone]  [phone] [] [facility]  Outpatient Rehabilitation &  Therapy  [address]  P: [phone]  F: [phone] [] [facility]  Outpatient Rehabilitation &  Therapy  [address]  P: [phone]  F: [phone] [] [facility]  Outpatient Rehabilitation &  Therapy  [address]  [phone]  [phone] [] [facility]  Outpatient Rehabilitation &  Therapy  [address]   P: [phone]  F: [phone]  [] [facility]  Outpatient Rehabilitation &  Therapy  [address]  P: [phone]  F: [phone] [] [facility]  Outpatient Rehabilitation &  Therapy  [address]  P: [phone]  F: [phone] [] [facility]  Outpatient Rehabilitation &  Therapy  [address]  P: [phone]  F: [phone] [] [facility]  Outpatient Rehabilitation &  Therapy  [address]  P: [phone]  F: [phone]  [] [facility] Outpatient Rehabilitation &  Therapy  [address]  P: [phone]  F: [phone]     Physical Therapy Daily Treatment Note    Date:  2025  Patient Name:  Jeramie Lainez   \"YA YA\" :  1995  MRN: [id number]  Physician: MYESHA Chavez-IRENE                                Insurance: [facility] Medicaid (30 vs)  Medical Diagnosis: R27.0 (ICD-10-CM) - Ataxia   Rehab Codes: R26.89, M62.81, R29.3  Next 's appt.: 25 neuro  Date of symptom onset: 3/3/25 (chronic onset so used date of referral)  Visit# / total visits:     Cancels/No Shows: 0    Subjective:    Pain:  [] Yes  [x] No Location:  N/A Pain Rating: (0-10 scale) none reported/10  Pain altered Tx:  [x] No  [] Yes  Action:  Comments: Pt

## 2025-04-16 ENCOUNTER — HOSPITAL ENCOUNTER (OUTPATIENT)
Age: 30
Setting detail: THERAPIES SERIES
Discharge: HOME | End: 2025-04-16
Payer: COMMERCIAL

## 2025-04-16 PROCEDURE — 97116 GAIT TRAINING THERAPY: CPT | Performed by: PHYSICAL THERAPIST

## 2025-04-16 PROCEDURE — 97112 NEUROMUSCULAR REEDUCATION: CPT | Performed by: PHYSICAL THERAPIST

## 2025-04-16 NOTE — FLOWSHEET NOTE
CHEATHAM BALANCE SCALE 14-Item Long Form Original Version    Patient Name:  Jeramie Lainez  Date:  4/16/2025    1. SITTING TO STANDING  INSTRUCTIONS: Please stand up. Try not to use your hands for support.  (4) able to stand without using hands and stabilize independently  (3) able to stand independently using hands  (2) able to stand using hands after several tries  (1) needs minimal aid to stand or to stabilize  (0) needs moderate or maximal assist to stand  Score: 3    2. STANDING UNSUPPORTED  INSTRUCTIONS: Please stand for two minutes without holding.  (4) able to stand safely 2 minutes  (3) able to stand 2 minutes with supervision  (2) able to stand 30 seconds unsupported  (1) needs several tries to stand 30 seconds unsupported  (0) unable to stand 30 seconds unassisted If a subject is able to stand 2  minutes unsupported, score full points for sitting unsupported. Proceed to  item #4.  Score: 3    3. SITTING WITH BACK UNSUPPORTED BUT FEET SUPPORTED  ON FLOOR OR ON A STOOL  INSTRUCTIONS: Please sit with arms folded for 2 minutes.  (4) able to sit safely and securely 2 minutes  (3) able to sit 2 minutes under supervision  (2) able to sit 30 seconds  (1) able to sit 10 seconds  (0) unable to sit without support 10 seconds  Score: 4     4. STANDING TO SITTING  INSTRUCTIONS: Please sit down.  (4) sits safely with minimal use of hands  (3) controls descent by using hands  (2) uses back of legs against chair to control descent  (1) sits independently but has uncontrolled descent  (0) needs assistance to sit  Score: 2    5. TRANSFERS  INSTRUCTIONS: Arrange chairs(s) for a pivot transfer. Ask subject to  transfer one way toward a seat with armrests and one way toward a seat  without armrests. You may use two chairs (one with and one without  armrests) or a bed and a chair.  (4) able to transfer safely with minor use of hands  (3) able to transfer safely definite need of hands  (2) able to transfer with verbal cueing

## 2025-04-16 NOTE — FLOWSHEET NOTE
[x] [facility]  Outpatient Rehabilitation &  Therapy  [address]  [phone]  [phone] [] [facility]  Outpatient Rehabilitation &  Therapy  [address]  P: [phone]  F: [phone] [] [facility]  Outpatient Rehabilitation &  Therapy  [address]  P: [phone]  F: [phone] [] [facility]  Outpatient Rehabilitation &  Therapy  [address]  [phone]  [phone] [] [facility]  Outpatient Rehabilitation &  Therapy  [address]   P: [phone]  F: [phone]  [] [facility]  Outpatient Rehabilitation &  Therapy  [address]  P: [phone]  F: [phone] [] [facility]  Outpatient Rehabilitation &  Therapy  [address]  P: [phone]  F: [phone] [] [facility]  Outpatient Rehabilitation &  Therapy  [address]  P: [phone]  F: [phone] [] [facility]  Outpatient Rehabilitation &  Therapy  [address]  P: [phone]  F: [phone]  [] [facility] Outpatient Rehabilitation &  Therapy  [address]  P: [phone]  F: [phone]     Physical Therapy Daily Treatment Note    Date:  2025  Patient Name:  Jeramie Lainez   \"YA YA\" :  1995  MRN: [id number]  Physician: MYESHA Chavez-IRENE                                Insurance: [facility] Medicaid (30 vs)  Medical Diagnosis: R27.0 (ICD-10-CM) - Ataxia   Rehab Codes: R26.89, M62.81, R29.3  Next 's appt.: 25 neuro  Date of symptom onset: 3/3/25 (chronic onset so used date of referral)  Visit# / total visits:     Cancels/No Shows: 0    Subjective:    Pain:  [] Yes  [x] No Location:  N/A Pain Rating: (0-10 scale) none reported/10  Pain altered Tx:  [x] No  [] Yes  Action:  Comments: Pt  1.  DM Type II (Oral Medication) without sign of diabetic retinopathy and no blot heme on dilated retinal examination today OU No Macular Edema:  Discussed the pathophysiology of diabetes and its effect on the eye and risk of blindness. Stressed the importance of strong glucose control. Advised of importance of at least yearly dilated examinations but to contact us immediately for any problems or concerns. 2. CLARISSE w/ PEK OU-The use/continuation of artificial tears were recommended. 3.  Pseudophakia OU - (Restor OD *Not PMG OD* Standard OS *PMG*)  4.  Macular Drusen OD- Observe 5. PVD w/o Tear OD - RD precautions. 6.  H/o Previous Retinal Tear OD H/o Laser Retinopexy OD by Dr. Griselda Elder. MRX for glasses given. Return for an appointment in 1 year 27 with Dr. Julia Veloz.

## 2025-04-18 ENCOUNTER — HOSPITAL ENCOUNTER (OUTPATIENT)
Age: 30
Setting detail: THERAPIES SERIES
Discharge: HOME | End: 2025-04-18
Payer: COMMERCIAL

## 2025-04-18 PROCEDURE — 97116 GAIT TRAINING THERAPY: CPT

## 2025-04-18 PROCEDURE — 97112 NEUROMUSCULAR REEDUCATION: CPT

## 2025-04-18 NOTE — FLOWSHEET NOTE
(to be met in 8 treatments) - Assessed on 4/16/25 by Tara Guerra, PT:  ? Pain: No more than 4/10 max pain in RLE post therapy to indicate improving tolerance to increased activity and exercise - NOT MET, 6/10 lower leg, cramping  ? ROM: At least 10deg DF PROM in RLE to indicate improving mobility required for ankle balance strategies, appropriate ankle mobility for gait mechanics - NOT MET, 2 deg  ? Balance:   Leonard scale score to at least 28/56 to indicate slowly improving standing balance and progress towards reduced fall risk - NOT MET, 23/56  Pt reporting no more than 1 fall per day to indicate reduced fall incidence since beginning therapy - MET, although difficult to understand with conflicting reports pt notes she is not falling currently  ? Strength: Grossly 3/5 at minimum in R hip/ankle musculature to indicate improving strength for prolonged gait, stair climbing - NOT MET, 2-/5 R hip ext/abd/add, 3-/5 hip flex; 2+/5 R ankle DF/PF  ? Function:  Pt able to ambulate at least 300 ft consecutively with .68m/s using LRAD to indicate improving gait endurance and speed - Partially met, .42m/s for 500 ft  Pt able to ascend/descend flight of stairs with unilat UE assist using step-to pattern with improved speed and safety noted - Ongoing, still with decreased speed and safety with descent  Pt able to complete floor transfers with UE assist and only CGA from therapist for safety  Any bracing needs will have been explored and/or assessed for at this time. - MET, getting R AFO on 5/9 by Rob  Patient to be independent with home exercise program as demonstrated by performance with correct form without cues. - MET  Demonstrate Knowledge of fall prevention - MET, fall prevention issued 4/16 and discussed together any questions about interpretation/understanding via  on 4/16     LTG: (to be met in 16 treatments)  ? Pain: No more than 3/10 max pain in RLE post therapy to indicate improving tolerance to

## 2025-04-21 ENCOUNTER — HOSPITAL ENCOUNTER (OUTPATIENT)
Age: 30
Setting detail: THERAPIES SERIES
Discharge: HOME | End: 2025-04-21
Payer: COMMERCIAL

## 2025-04-21 PROCEDURE — 97112 NEUROMUSCULAR REEDUCATION: CPT | Performed by: PHYSICAL THERAPIST

## 2025-04-21 PROCEDURE — 97110 THERAPEUTIC EXERCISES: CPT | Performed by: PHYSICAL THERAPIST

## 2025-04-21 PROCEDURE — 97116 GAIT TRAINING THERAPY: CPT | Performed by: PHYSICAL THERAPIST

## 2025-04-21 NOTE — FLOWSHEET NOTE
[x] [facility]  Outpatient Rehabilitation &  Therapy  [address]  [phone]  [phone] [] [facility]  Outpatient Rehabilitation &  Therapy  [address]  P: [phone]  F: [phone] [] [facility]  Outpatient Rehabilitation &  Therapy  [address]  P: [phone]  F: [phone] [] [facility]  Outpatient Rehabilitation &  Therapy  [address]  [phone]  [phone] [] [facility]  Outpatient Rehabilitation &  Therapy  [address]   P: [phone]  F: [phone]  [] [facility]  Outpatient Rehabilitation &  Therapy  [address]  P: [phone]  F: [phone] [] [facility]  Outpatient Rehabilitation &  Therapy  [address]  P: [phone]  F: [phone] [] [facility]  Outpatient Rehabilitation &  Therapy  [address]  P: [phone]  F: [phone] [] [facility]  Outpatient Rehabilitation &  Therapy  [address]  P: [phone]  F: [phone]  [] [facility] Outpatient Rehabilitation &  Therapy  [address]  P: [phone]  F: [phone]     Physical Therapy Daily Treatment Note    Date:  2025  Patient Name:  Jeramie Lainez   \"YA YA\" :  1995  MRN: [id number]  Physician: MYESHA Chavez-IRENE                                Insurance: [facility] Medicaid (30 vs)  Medical Diagnosis: R27.0 (ICD-10-CM) - Ataxia   Rehab Codes: R26.89, M62.81, R29.3  Next 's appt.: 25 neuro  Date of symptom onset: 3/3/25 (chronic onset so used date of referral)  Visit# / total visits: 10/16    Cancels/No Shows: 0    Subjective:    Pain:  [] Yes  [x] No Location:  N/A Pain Rating: (0-10 scale) none reported/10  Pain altered Tx:  [x] No  [] Yes  Action:  Comments:

## 2025-04-25 ENCOUNTER — HOSPITAL ENCOUNTER (OUTPATIENT)
Age: 30
Setting detail: THERAPIES SERIES
Discharge: HOME | End: 2025-04-25
Payer: COMMERCIAL

## 2025-04-25 PROCEDURE — 97112 NEUROMUSCULAR REEDUCATION: CPT

## 2025-04-25 NOTE — FLOWSHEET NOTE
[x] [facility]  Outpatient Rehabilitation &  Therapy  [address]  [phone]  [phone]     Physical Therapy Daily Treatment Note    Date:  2025  Patient Name:  Jeramie Lainez   \"YA YA\" :  1995  MRN: [id number]  Physician: MYESHA Chavez-IRENE                                Ins[facility]: [facility] (30 vs)  Medical Diagnosis: R27.0 (ICD-10-CM) - Ataxia   Rehab Codes: R26.89, M62.81, R29.3  Next 's appt.: 25 neuro  Date of symptom onset: 3/3/25 (chronic onset so used date of referral)    Visit# / total visits:     Cancels/No Shows: 0    Subjective:    Pain:  [] Yes  [x] No Location:  N/A Pain Rating: (0-10 scale) none reported/10  Pain altered Tx:  [x] No  [] Yes  Action:  Comments:  Patient reports and clarifies, did not have a full fall over the weekend. Instead, has had instances where she feels like she is going to fall by tripping over her toes/shoes. Reports she is feeling very tired today, did not sleep well last night and just does not have as much energy but does deny pain in the knee.      ASL  Raquel present for treatment 25    Objective:  Modalities:   Precautions [] No  [x] Yes: fall risk, high fatigability. Cold pack given for temperature control per request  Exercises: Exercises completed 2025 marked with \"x\":   Exercise Reps/ Time Weight/ Level Comments           Nu-step 6 min L3 RPE: 10  - self-paced ~60-70 steps / min x                 Standing       Gastroc stretch on wall 3x30\"    x   Staggered stance sit <> stands 10-15x 24\" box RLE under, LLE more fwd  Multiple rounds education on posture and technique leading to inconsistent counting of reps 25      Step up to foam 10x ea LE  Lateral- one UE assist    HS stretches 3x20\" ea  Stool, completed seated            Balance       NBOS  2x30\"  3x1-1:30\"  Increased time and added head turns to converse with   x   Feet shoulder width

## 2025-04-28 ENCOUNTER — HOSPITAL ENCOUNTER (OUTPATIENT)
Age: 30
Setting detail: THERAPIES SERIES
Discharge: HOME | End: 2025-04-28
Payer: COMMERCIAL

## 2025-04-28 PROCEDURE — 97112 NEUROMUSCULAR REEDUCATION: CPT | Performed by: PHYSICAL THERAPIST

## 2025-04-28 NOTE — FLOWSHEET NOTE
[x] [facility]  Outpatient Rehabilitation &  Therapy  [address] [place] [address]  [phone]  [phone]     Physical Therapy Daily Treatment Note    Date:  2025  Patient Name:  Jeramie Lainez   \"YA YA\" :  1995  MRN: [id number]  Physician: MYESHA Chavez-IRENE                                Insurance: [facility] (30 vs)  Medical Diagnosis: R27.0 (ICD-10-CM) - Ataxia   Rehab Codes: R26.89, M62.81, R29.3  Next 's appt.: 25 neuro  Date of symptom onset: 3/3/25 (chronic onset so used date of referral)    Visit# / total visits:     Cancels/No Shows: 0    Subjective:    Pain:  [] Yes  [x] No Location:  N/A Pain Rating: (0-10 scale) none reported/10  Pain altered Tx:  [x] No  [] Yes  Action:  Comments:  Patient denies any difficulties or balance troubles lately.     [facility]  Raquel present for treatment 25    Objective:  Modalities:   Precautions [] No  [x] Yes: fall risk, high fatigability. Cold pack given for temperature control per request  Exercises: Exercises completed 2025 marked with \"x\":   Exercise Reps/ Time Weight/ Level Comments           Nu-step 6 min L3 RPE: 10  - self-paced ~60-70 steps / min x                 Standing       Gastroc stretch on wall 3x30\"    x   Staggered stance sit <> stands 10-15x 24\" box RLE under, LLE more fwd  Multiple rounds education on posture and technique leading to inconsistent counting of reps 25      Step up to foam 10x ea LE  Lateral- one UE assist    HS stretches 3x20\" ea  Stool, completed seated            Balance       NBOS  2x30\"  3x1-1:30\"  Increased time and added head turns to converse with      Feet shoulder width apart 2x20\"  Alt w/ NBOS    Tandem       Alternating step taps 3x10  Unilat UE assist  -  BIONESS DONNED, GAIT MODE USED     Twist to high five x  Completed to tire  ~2 min x   Ball reaches 5x ea  1) Down to floor then up  2) Lateral step out and

## 2025-04-29 NOTE — FLOWSHEET NOTE
[x] [facility]  Outpatient Rehabilitation &  Therapy  [address]  [phone]  F: [phone] [] [facility]  Outpatient Rehabilitation &  Therapy  [address]  P: [phone]  F: [phone] [] [facility]  Outpatient Rehabilitation &  Therapy  [address]  P: [phone]  F: [phone] [] [facility]  Outpatient Rehabilitation &  Therapy  [address]  P: [phone]  F: [phone] [] [facility]  Outpatient Rehabilitation &  Therapy  [address]   P: [phone]  F: [phone]  [] [facility]  Outpatient Rehabilitation &  Therapy  [address]   P: [phone]  F: [phone] [] [facility]  Outpatient Rehabilitation &  Therapy  [address]  P: [phone]  F: [phone] [] [facility]  Outpatient Rehabilitation &  Therapy  [address]  P: [phone]  F: [phone] [] [facility]  Outpatient Rehabilitation &  Therapy  [address]  P: [phone]  F: [phone]           Jeramie Lainez   1995   [id number]    4/29/2025     called in to change appt time for 5/1/2025 to 5pm.  Was able to rearrange patients.  Left  a VM to come at 5pm on 5/1/25; as well as left a VM for patient to come at 5pm on 5/1/2025.    Electronically signed by Aurea Ann PT on 4/29/2025 at 11:49 AM

## 2025-05-01 ENCOUNTER — HOSPITAL ENCOUNTER (OUTPATIENT)
Age: 30
Setting detail: THERAPIES SERIES
Discharge: HOME | End: 2025-05-01
Payer: COMMERCIAL

## 2025-05-01 PROCEDURE — 97110 THERAPEUTIC EXERCISES: CPT | Performed by: PHYSICAL THERAPIST

## 2025-05-01 PROCEDURE — 97112 NEUROMUSCULAR REEDUCATION: CPT | Performed by: PHYSICAL THERAPIST

## 2025-05-01 NOTE — FLOWSHEET NOTE
[x] [facility]  Outpatient Rehabilitation &  Therapy  [address]  [phone]  [phone]     Physical Therapy Daily Treatment Note    Date:  2025  Patient Name:  Jeramie Lainez   \"YA YA\" :  1995  MRN: [id number]  Physician: MYESHA Chavez-IRENE                                Ins[facility]: [facility] (30 vs)  Medical Diagnosis: R27.0 (ICD-10-CM) - Ataxia   Rehab Codes: R26.89, M62.81, R29.3  Next 's appt.: 25 neuro  Date of symptom onset: 3/3/25 (chronic onset so used date of referral)    Visit# / total visits:     Cancels/No Shows: 0    Subjective:    Pain:  [] Yes  [x] No Location:  N/A Pain Rating: (0-10 scale) none reported/10  Pain altered Tx:  [x] No  [] Yes  Action:  Comments:  Patient notes she almost fell backwards earlier today - tried taking a bunch of steps and just couldn't catch her balance fully.     ASL  Raquel present for treatment 25    Objective:  Modalities:   Precautions [] No  [x] Yes: fall risk, high fatigability. Cold pack given for temperature control per request  Exercises: Exercises completed 2025 marked with \"x\":   Exercise Reps/ Time Weight/ Level Comments           Nu-step 5 min L3 RPE: 10  - self-paced ~60-70 steps / min x                 Standing       Gastroc stretch on wall 3x30\"    x   Staggered stance sit <> stands 10-15x 24\" box RLE under, LLE more fwd  Multiple rounds education on posture and technique leading to inconsistent counting of reps 25      Step up to foam 10x ea LE  Lateral- one UE assist    HS stretches 3x20\" ea  Stool, completed seated            Balance   Bioness donned throughout on gait mode on     NBOS  2x30\"  3x1-1:30\"  Increased time and added head turns to converse with      Feet shoulder width apart 2x20\"  Alt w/ NBOS    Tandem       Alternating step taps 3x10  Unilat UE assist  -  BIONESS DONNED, GAIT MODE USED  x   Twist to high

## 2025-05-02 ENCOUNTER — APPOINTMENT (OUTPATIENT)
Age: 30
End: 2025-05-02
Payer: COMMERCIAL

## 2025-05-05 ENCOUNTER — APPOINTMENT (OUTPATIENT)
Age: 30
End: 2025-05-05
Payer: COMMERCIAL

## 2025-05-07 ENCOUNTER — HOSPITAL ENCOUNTER (OUTPATIENT)
Age: 30
Setting detail: THERAPIES SERIES
Discharge: HOME | End: 2025-05-07
Payer: COMMERCIAL

## 2025-05-07 PROCEDURE — 97112 NEUROMUSCULAR REEDUCATION: CPT

## 2025-05-07 NOTE — FLOWSHEET NOTE
[x] [facility]  Outpatient Rehabilitation &  Therapy  [address] [place] [address]  [phone]  [phone]     Physical Therapy Daily Treatment Note    Date:  2025  Patient Name:  Jeramie Lainez   \"YA YA\" :  1995  MRN: [id number]  Physician: MYESHA Chavez-IRENE                                Insurance: [facility] (30 vs)  Medical Diagnosis: R27.0 (ICD-10-CM) - Ataxia   Rehab Codes: R26.89, M62.81, R29.3  Next 's appt.: 25 neuro  Date of symptom onset: 3/3/25 (chronic onset so used date of referral)    Visit# / total visits:     Cancels/No Shows: 0    Subjective:    Pain:  [] Yes  [x] No Location:  N/A Pain Rating: (0-10 scale) none reported/10  Pain altered Tx:  [x] No  [] Yes  Action:  Comments:  Upon ambulating back in to clinic from waiting room, patient displayed significant bilateral knee flexion, toe drag, and near tripping. Reports \"she feels off\" and LE are feeling \"weird\", but indicates no additional symptoms to support impaired mechanics. Denies pain in the LE.     ASL  Raquel present for treatment 25    Objective:  Modalities:   Precautions [] No  [x] Yes: fall risk, high fatigability. Cold pack given for temperature control per request  Exercises: Exercises completed 2025 marked with \"x\":   Exercise Reps/ Time Weight/ Level Comments           Nu-step 5 min L3 RPE: 10  - self-paced ~60-70 steps / min x                 Standing       Gastroc stretch on wall 3x30\"    x   Staggered stance sit <> stands 10-15x 24\" box RLE under, LLE more fwd  Multiple rounds education on posture and technique leading to inconsistent counting of reps 25      Step up to foam 10x ea LE  Lateral- one UE assist    HS stretches 3x20\" ea  Stool, completed seated            Balance     NBOS  2x30\"  3x1-1:30\"  Increased time and added head turns to converse with      Feet shoulder width apart 2x20\"  Alt w/ NBOS    Tandem

## 2025-05-09 ENCOUNTER — HOSPITAL ENCOUNTER (OUTPATIENT)
Age: 30
Setting detail: THERAPIES SERIES
Discharge: HOME | End: 2025-05-09
Payer: COMMERCIAL

## 2025-05-09 PROCEDURE — 97112 NEUROMUSCULAR REEDUCATION: CPT

## 2025-05-09 NOTE — FLOWSHEET NOTE
[x] [facility]  Outpatient Rehabilitation &  Therapy  [address]  [phone]  [phone]     Physical Therapy Daily Treatment Note    Date:  2025  Patient Name:  Jeramie Lainez   \"YA YA\" :  1995  MRN: [id number]  Physician: Norma Lance, MYESHA-IRENE                                Insurance: [employer] [facility] (30 vs)  Medical Diagnosis: R27.0 (ICD-10-CM) - Ataxia   Rehab Codes: R26.89, M62.81, R29.3  Next 's appt.: 25 neuro  Date of symptom onset: 3/3/25 (chronic onset so used date of referral)    Visit# / total visits: 15/16    Cancels/No Shows: 0    Subjective:    Pain:  [] Yes  [x] No Location:  N/A Pain Rating: (0-10 scale) none reported/10  Pain altered Tx:  [x] No  [] Yes  Action:  Comments: Patient arrives noting she needs to use restroom prior to starting treatment session. Reports that her right shoulder feels numb and heavy today, unsure as to why. Also notes the right leg is still the same as Wednesday. Symptoms came on quickly today. States she did start her menstrual cycle today. Was unable to eat all of her lunch today as she was not feeling well.     Took /80    ASL  Raquel present for treatment 25    Objective:  Modalities:   Precautions [] No  [x] Yes: fall risk, high fatigability. Cold pack given for temperature control per request  Exercises: Exercises completed 2025 marked with \"x\":   Exercise Reps/ Time Weight/ Level Comments           Nu-step 6  min L3 RPE: 10  - self-paced ~60-70 steps / min  Subjective collected  Grey band around knees 1 min x                 Standing       Gastroc stretch on wall  3x30\"    x   Staggered stance sit <> stands 10-15x 24\" box RLE under, LLE more fwd  Multiple rounds education on posture and technique leading to inconsistent counting of reps 25      Step up to foam 10x ea LE  Lateral- one UE assist    HS stretches 3x20\" ea  Stool, completed seated            Balance

## 2025-05-12 ENCOUNTER — HOSPITAL ENCOUNTER (OUTPATIENT)
Age: 30
Setting detail: THERAPIES SERIES
Discharge: HOME | End: 2025-05-12
Payer: COMMERCIAL

## 2025-05-12 PROCEDURE — 97110 THERAPEUTIC EXERCISES: CPT | Performed by: PHYSICAL THERAPIST

## 2025-05-12 PROCEDURE — 97112 NEUROMUSCULAR REEDUCATION: CPT | Performed by: PHYSICAL THERAPIST

## 2025-05-12 NOTE — PROGRESS NOTES
[x] [facility]  Outpatient Rehabilitation &  Therapy  [address]  [phone]  [phone] [] [facility]  Outpatient Rehabilitation &  Therapy  [address]  P: [phone]  F: [phone] [] [facility]  Outpatient Rehabilitation &  Therapy  [address]  P: [phone]  F: [phone] [] [facility]  Outpatient Rehabilitation &  Therapy  [address]  [phone]  [phone] [] [facility]  Outpatient Rehabilitation &  Therapy  [address]   P: [phone]  F: [phone]  [] [facility]  Outpatient Rehabilitation &  Therapy  [address]  P: [phone]  F: [phone] [] [facility]  Outpatient Rehabilitation &  Therapy  [address]  P: [phone]  F: [phone] [] [facility]  Outpatient Rehabilitation &  Therapy  [address]  P: [phone]  F: [phone] [] [facility]  Outpatient Rehabilitation &  Therapy  [address]  P: [phone]  F: [phone]  [] [facility] Outpatient Rehabilitation &  Therapy  [address]  P: [phone]  F: [phone]     Physical Therapy Progress Note    Date: 2025      Patient: Jeramie Lainez  : 1995  MRN: [id number]    Physician: MYESHA Chavez-IRENE                                Insurance: [employer] [facility] [employer] (30 vs)  Medical Diagnosis: R27.0 (ICD-10-CM) - Ataxia   Rehab Codes: R26.89, M62.81, R29.3  Next 's appt.: 25 neuro  Date of symptom onset: 3/3/25 (chronic onset so used date of referral)     Visit# / total visits:                     Cancels/No Shows: 0  Date range of services: 3/12/25 to 25      Subjective:  Pain:  [] Yes  [x] No   Location:  N/A Pain Rating: (0-10 scale) none reported/10  Pain altered

## 2025-05-12 NOTE — FLOWSHEET NOTE
[facility] BALANCE SCALE 14-Item Long Form Original Version    Patient Name:  Jeramie Lainez  Date:  5/12/2025    1. SITTING TO STANDING  INSTRUCTIONS: Please stand up. Try not to use your hands for support.  (4) able to stand without using hands and stabilize independently  (3) able to stand independently using hands  (2) able to stand using hands after several tries  (1) needs minimal aid to stand or to stabilize  (0) needs moderate or maximal assist to stand  Score: 3    2. STANDING UNSUPPORTED  INSTRUCTIONS: Please stand for two minutes without holding.  (4) able to stand safely 2 minutes  (3) able to stand 2 minutes with supervision  (2) able to stand 30 seconds unsupported  (1) needs several tries to stand 30 seconds unsupported  (0) unable to stand 30 seconds unassisted If a subject is able to stand 2  minutes unsupported, score full points for sitting unsupported. Proceed to  item #4.  Score: 3    3. SITTING WITH BACK UNSUPPORTED BUT FEET SUPPORTED  ON FLOOR OR ON A STOOL  INSTRUCTIONS: Please sit with arms folded for 2 minutes.  (4) able to sit safely and securely 2 minutes  (3) able to sit 2 minutes under supervision  (2) able to sit 30 seconds  (1) able to sit 10 seconds  (0) unable to sit without support 10 seconds  Score: 4     4. STANDING TO SITTING  INSTRUCTIONS: Please sit down.  (4) sits safely with minimal use of hands  (3) controls descent by using hands  (2) uses back of legs against chair to control descent  (1) sits independently but has uncontrolled descent  (0) needs assistance to sit  Score: 2    5. TRANSFERS  INSTRUCTIONS: Arrange chairs(s) for a pivot transfer. Ask subject to  transfer one way toward a seat with armrests and one way toward a seat  without armrests. You may use two chairs (one with and one without  armrests) or a bed and a chair.  (4) able to transfer safely with minor use of hands  (3) able to transfer safely definite need of hands  (2) able to transfer with verbal cueing 
would continue to benefit from skilled physical therapy services in order to: improve balance/gait impairment and reduce fall risk and daily falls, increase BLE strength (R>L) as able, and promote improved functioning and independence with all ADL/IADLs.     STG: (to be met in 8 treatments) - Assessed on 4/16/25 by Tara Guerra, PT:  ? Pain: No more than 4/10 max pain in RLE post therapy to indicate improving tolerance to increased activity and exercise - NOT MET, 6/10 lower leg, cramping  ? ROM: At least 10deg DF PROM in RLE to indicate improving mobility required for ankle balance strategies, appropriate ankle mobility for gait mechanics - NOT MET, 2 deg  ? Balance:   Leonard scale score to at least 28/56 to indicate slowly improving standing balance and progress towards reduced fall risk - NOT MET, 23/56  Pt reporting no more than 1 fall per day to indicate reduced fall incidence since beginning therapy - MET, although difficult to understand with conflicting reports pt notes she is not falling currently  ? Strength: Grossly 3/5 at minimum in R hip/ankle musculature to indicate improving strength for prolonged gait, stair climbing - NOT MET, 2-/5 R hip ext/abd/add, 3-/5 hip flex; 2+/5 R ankle DF/PF  ? Function:  Pt able to ambulate at least 300 ft consecutively with .68m/s using LRAD to indicate improving gait endurance and speed - Partially met, .42m/s for 500 ft  Pt able to ascend/descend flight of stairs with unilat UE assist using step-to pattern with improved speed and safety noted - Ongoing, still with decreased speed and safety with descent  Pt able to complete floor transfers with UE assist and only CGA from therapist for safety  Any bracing needs will have been explored and/or assessed for at this time. - MET, getting R AFO on 5/9 by Rob  Patient to be independent with home exercise program as demonstrated by performance with correct form without cues. - MET  Demonstrate Knowledge of fall prevention -

## 2025-05-14 ENCOUNTER — TELEPHONE (OUTPATIENT)
Dept: NEUROLOGY | Age: 30
End: 2025-05-14

## 2025-05-14 NOTE — TELEPHONE ENCOUNTER
Tara with [facility] physical therapy called in about this mutual patient. She stated that pt has been getting worse in her gait. She is having sensory disturbance in her arm that is new since last week. Her LP results have been in since the end of March and pt has not been notified. She is worried about what they may mean.    She is also having issues with living in a second floor apartment.    If you can please advise on this. Pt's follow up is not until June 30 at 2 pm.    If you would like to discuss with Tara, her phone number is [phone].

## 2025-05-20 ENCOUNTER — OFFICE VISIT (OUTPATIENT)
Dept: NEUROLOGY | Age: 30
End: 2025-05-20

## 2025-05-20 VITALS
HEART RATE: 85 BPM | HEIGHT: 63 IN | SYSTOLIC BLOOD PRESSURE: 121 MMHG | WEIGHT: 248.2 LBS | BODY MASS INDEX: 43.98 KG/M2 | DIASTOLIC BLOOD PRESSURE: 83 MMHG

## 2025-05-20 DIAGNOSIS — E55.9 VITAMIN D DEFICIENCY: ICD-10-CM

## 2025-05-20 DIAGNOSIS — N97.9 INFERTILITY, FEMALE: ICD-10-CM

## 2025-05-20 DIAGNOSIS — G35 MULTIPLE SCLEROSIS (HCC): Primary | ICD-10-CM

## 2025-05-20 DIAGNOSIS — R20.2 NUMBNESS AND TINGLING: ICD-10-CM

## 2025-05-20 DIAGNOSIS — N32.81 OVERACTIVE BLADDER: ICD-10-CM

## 2025-05-20 DIAGNOSIS — R20.0 NUMBNESS AND TINGLING: ICD-10-CM

## 2025-05-20 DIAGNOSIS — G37.3 TRANSVERSE MYELITIS (HCC): ICD-10-CM

## 2025-05-20 RX ORDER — OXYBUTYNIN CHLORIDE 10 MG/1
10 TABLET, EXTENDED RELEASE ORAL DAILY
Qty: 30 TABLET | Refills: 3 | Status: SHIPPED | OUTPATIENT
Start: 2025-05-20

## 2025-05-20 ASSESSMENT — ENCOUNTER SYMPTOMS
ALLERGIC/IMMUNOLOGIC NEGATIVE: 1
GASTROINTESTINAL NEGATIVE: 1

## 2025-05-20 NOTE — PROGRESS NOTES
Attestation:    Briefly, Ms. Lainez is a 29-year-old female who is unable to have MRIs due to cochlear implants, presenting today as a new patient for evaluation of possible multiple sclerosis.  She has a history of progressive balance impairment and right lower extremity weakness starting in 2019 or 2020.  She was noted to have an ataxic gait and right lower extremity clonus and ultimately underwent CT myelogram with neurosurgery.  This was unrevealing.  She underwent CSF studies, which revealed 4 white blood cells and 17 unpared oligoclonal bands.  Her exam revealed spastic right lower extremity weakness and decreased sensation in the right foot, medial leg, and thigh.  As stated above, she is unable to undergo MRI.  At this point, I am highly suspicious of multiple sclerosis as a diagnosis.  We will proceed with testing for other causes of her symptoms, as well as obtaining some ancillary studies for further clarification.      Plan:    1.  I recommend checking the following labs to assess for MS mimics: T. pallidum antibody, ESR, CRP, JOHANNA, double-stranded DNA, SSA and SSB antibody, HIV screen, vitamin B12, folate, and AQP4 and MOG Abs.     2.  I recommend checking visual evoked potentials and somatosensory evoked potentials of the upper and lower extremities.    3.  I recommend checking a vitamin D level and supplementing vitamin D to a goal level of 50 to 100 ng/mL unless there are contraindications to this.    4.  We discussed the pathophysiology, typical disease course, prognosis, and concept of disease modifying therapy treatment and multiple sclerosis.  We discussed the concept of an MS relapse, which is any new neurological symptoms lasting greater than 48 hours.    5.  The following symptomatic issues were discussed today:    A.  Family planning: I we will refer her to reproductive endocrinology.  We briefly discussed that there are several disease modifying therapies that could be used in the setting 
use of “highly effective therapies” in “higher risk” patients.   These patients are at high risk for permanent neurological disability and earlier onset of progressive disease. For this reason, I strongly recommend early use of highly effective disease modifying therapies rather than an escalation approach.    For Jeramie Lainez, concerning prognostic demographic features include:  ***    For Jeramie Lainez, concerning prognostic features of MS attacks include: ***    For Jeramie Lainez, concerning prognostic findings on MR imaging include: ***    PLAN:     In lieu of MRI, we will check a CT brain w/wo contrast at this time.     2.  I will check a serum lab work-up for common MS mimics.     3.  Consider repeat CSF studies with protein, glucose, IgG index, etc.     4.  I will check a vitamin D level today.  Our goal level is  ng/mL unless there are contraindications to supplementation.      5.  I discussed the pathophysiology, natural history, disease course, and prognosis of multiple sclerosis.  I discussed the realistic goals of disease modifying therapies, which is to reduce the frequency of MS attacks and decrease neurological worsening.  We discussed the concept of a relapse/exacerbation/attack and asked the patient to notify us for any new or worsening neurological symptoms lasting > 48 hours in duration.  We also discussed the concept of a pseudo-relapse, which is worsening of baseline symptoms or reappearance of symptoms from old attacks in the setting of infection or other stressors.      6.  I reviewed relevant mechanism of action, efficacy, safety and tolerability of several multiple sclerosis DMTs.  Specifically, we discussed ***.  The patient has decided to start/continue ***    Given that he/she/they are on this DMT *** planning to start this DMT, the following labs are needed for safety monitoring: ***    9.  The following additional diagnoses were addressed today:     A. Ataxia: I

## 2025-05-21 ENCOUNTER — TELEPHONE (OUTPATIENT)
Dept: NEUROLOGY | Age: 30
End: 2025-05-21

## 2025-05-21 DIAGNOSIS — G35 MULTIPLE SCLEROSIS (HCC): Primary | ICD-10-CM

## 2025-05-21 NOTE — TELEPHONE ENCOUNTER
Fax order [phone], spoke with Mayda. Need clinicals and demographics faxed with the order as well.     I called the patient also to let her know that we do not have any providers within our system who do this testing and [facility] will have to complete.     ID number is [id number].    Received confirmation from patients  that this was acceptable to the patient. All questions answered advised [facility] will reach out to schedule.     Order faxed.

## 2025-05-21 NOTE — TELEPHONE ENCOUNTER
Dr. Amaral ordered a VEPT, central scheduling looked into this and none of our hospitals in [place] provide this testing. I reached out to Dr. Sharma to see if he provided this testing. He advised that he refers his patients to [facility], according to their website they do offer this testing. I left a message on their office phone at [phone] requesting they call me back to let me know what I need to send to them to refer her.

## 2025-05-21 NOTE — TELEPHONE ENCOUNTER
Yes I can! Are there orders placed for those? I only see the VEPT order and a nerve conduction with EMG from 3/3/2025.

## 2025-05-22 NOTE — TELEPHONE ENCOUNTER
Spoke with Norma at [facility] they also offer the somatosensory testing. Orders faxed, they will reach out to patient to schedule.

## 2025-05-22 NOTE — TELEPHONE ENCOUNTER
[facility] office note be faxed over, will fax over once Dr. Amaral has completed and signed.

## 2025-05-29 ENCOUNTER — HOSPITAL ENCOUNTER (OUTPATIENT)
Age: 30
Setting detail: SPECIMEN
Discharge: HOME | End: 2025-05-29
Payer: COMMERCIAL

## 2025-05-29 ENCOUNTER — OFFICE VISIT (OUTPATIENT)
Age: 30
End: 2025-05-29
Payer: COMMERCIAL

## 2025-05-29 VITALS
DIASTOLIC BLOOD PRESSURE: 82 MMHG | HEART RATE: 89 BPM | WEIGHT: 245 LBS | SYSTOLIC BLOOD PRESSURE: 121 MMHG | BODY MASS INDEX: 43.4 KG/M2

## 2025-05-29 DIAGNOSIS — Z11.3 SCREENING EXAMINATION FOR STD (SEXUALLY TRANSMITTED DISEASE): ICD-10-CM

## 2025-05-29 DIAGNOSIS — Z12.4 PAP SMEAR FOR CERVICAL CANCER SCREENING: ICD-10-CM

## 2025-05-29 DIAGNOSIS — R93.89 ABNORMAL IMAGING OF THYROID: ICD-10-CM

## 2025-05-29 DIAGNOSIS — Z01.419 WELL WOMAN EXAM: Primary | ICD-10-CM

## 2025-05-29 LAB
ALBUMIN SERPL-MCNC: 4 G/DL (ref 3.5–5.2)
ALBUMIN/GLOB SERPL: 1.3 {RATIO} (ref 1–2.5)
ALP SERPL-CCNC: 115 U/L (ref 35–104)
ALT SERPL-CCNC: 14 U/L (ref 10–35)
ANION GAP SERPL CALCULATED.3IONS-SCNC: 14 MMOL/L (ref 9–16)
AST SERPL-CCNC: 17 U/L (ref 10–35)
BASOPHILS # BLD: 0.05 K/UL (ref 0–0.2)
BASOPHILS NFR BLD: 1 % (ref 0–2)
BILIRUB SERPL-MCNC: 0.5 MG/DL (ref 0–1.2)
BUN SERPL-MCNC: 9 MG/DL (ref 6–20)
CALCIUM SERPL-MCNC: 9.4 MG/DL (ref 8.6–10.4)
CHLORIDE SERPL-SCNC: 105 MMOL/L (ref 98–107)
CO2 SERPL-SCNC: 20 MMOL/L (ref 20–31)
CREAT SERPL-MCNC: 0.7 MG/DL (ref 0.6–0.9)
EOSINOPHIL # BLD: 0.08 K/UL (ref 0–0.44)
EOSINOPHILS RELATIVE PERCENT: 1 % (ref 1–4)
ERYTHROCYTE [DISTWIDTH] IN BLOOD BY AUTOMATED COUNT: 14.6 % (ref 11.8–14.4)
GFR, ESTIMATED: >90 ML/MIN/1.73M2
GLUCOSE SERPL-MCNC: 63 MG/DL (ref 74–99)
HCT VFR BLD AUTO: 38.6 % (ref 36.3–47.1)
HGB BLD-MCNC: 12 G/DL (ref 11.9–15.1)
IMM GRANULOCYTES # BLD AUTO: 0.04 K/UL (ref 0–0.3)
IMM GRANULOCYTES NFR BLD: 0 %
LYMPHOCYTES NFR BLD: 2.2 K/UL (ref 1.1–3.7)
LYMPHOCYTES RELATIVE PERCENT: 22 % (ref 24–43)
MCH RBC QN AUTO: 23.6 PG (ref 25.2–33.5)
MCHC RBC AUTO-ENTMCNC: 31.1 G/DL (ref 28.4–34.8)
MCV RBC AUTO: 76 FL (ref 82.6–102.9)
MONOCYTES NFR BLD: 0.62 K/UL (ref 0.1–1.2)
MONOCYTES NFR BLD: 6 % (ref 3–12)
NEUTROPHILS NFR BLD: 70 % (ref 36–65)
NEUTS SEG NFR BLD: 7.01 K/UL (ref 1.5–8.1)
NRBC BLD-RTO: 0 PER 100 WBC
PLATELET # BLD AUTO: 207 K/UL (ref 138–453)
PMV BLD AUTO: 12.7 FL (ref 8.1–13.5)
POTASSIUM SERPL-SCNC: 4 MMOL/L (ref 3.7–5.3)
PROT SERPL-MCNC: 7.2 G/DL (ref 6.6–8.7)
RBC # BLD AUTO: 5.08 M/UL (ref 3.95–5.11)
RBC # BLD: ABNORMAL 10*6/UL
SODIUM SERPL-SCNC: 139 MMOL/L (ref 136–145)
TSH SERPL DL<=0.05 MIU/L-ACNC: 3.36 UIU/ML (ref 0.27–4.2)
WBC OTHER # BLD: 10 K/UL (ref 3.5–11.3)

## 2025-05-29 PROCEDURE — 87480 CANDIDA DNA DIR PROBE: CPT

## 2025-05-29 PROCEDURE — 84443 ASSAY THYROID STIM HORMONE: CPT

## 2025-05-29 PROCEDURE — 80053 COMPREHEN METABOLIC PANEL: CPT

## 2025-05-29 PROCEDURE — 87624 HPV HI-RISK TYP POOLED RSLT: CPT

## 2025-05-29 PROCEDURE — 87491 CHLMYD TRACH DNA AMP PROBE: CPT

## 2025-05-29 PROCEDURE — 86780 TREPONEMA PALLIDUM: CPT

## 2025-05-29 PROCEDURE — 99385 PREV VISIT NEW AGE 18-39: CPT

## 2025-05-29 PROCEDURE — 99202 OFFICE O/P NEW SF 15 MIN: CPT

## 2025-05-29 PROCEDURE — G0145 SCR C/V CYTO,THINLAYER,RESCR: HCPCS

## 2025-05-29 PROCEDURE — 87591 N.GONORRHOEAE DNA AMP PROB: CPT

## 2025-05-29 PROCEDURE — 87660 TRICHOMONAS VAGIN DIR PROBE: CPT

## 2025-05-29 PROCEDURE — 83036 HEMOGLOBIN GLYCOSYLATED A1C: CPT

## 2025-05-29 PROCEDURE — 85025 COMPLETE CBC W/AUTO DIFF WBC: CPT

## 2025-05-29 PROCEDURE — 87389 HIV-1 AG W/HIV-1&-2 AB AG IA: CPT

## 2025-05-29 PROCEDURE — 36415 COLL VENOUS BLD VENIPUNCTURE: CPT

## 2025-05-29 PROCEDURE — 87510 GARDNER VAG DNA DIR PROBE: CPT

## 2025-05-29 PROCEDURE — 80074 ACUTE HEPATITIS PANEL: CPT

## 2025-05-30 LAB
C TRACH DNA SPEC QL PROBE+SIG AMP: NEGATIVE
CANDIDA SPECIES: NEGATIVE
EST. AVERAGE GLUCOSE BLD GHB EST-MCNC: 91 MG/DL
GARDNERELLA VAGINALIS: POSITIVE
HAV IGM SERPL QL IA: NONREACTIVE
HBA1C MFR BLD: 4.8 % (ref 4–6)
HBV CORE IGM SERPL QL IA: NONREACTIVE
HBV SURFACE AG SERPL QL IA: NONREACTIVE
HCV AB SERPL QL IA: NONREACTIVE
HIV 1+2 AB+HIV1 P24 AG SERPL QL IA: NONREACTIVE
N GONORRHOEA DNA SPEC QL PROBE+SIG AMP: NEGATIVE
SOURCE: ABNORMAL
SPECIMEN DESCRIPTION: NORMAL
T PALLIDUM AB SER QL IA: NONREACTIVE
TRICHOMONAS: NEGATIVE

## 2025-05-31 LAB
HPV I/H RISK 4 DNA CVX QL NAA+PROBE: NOT DETECTED
HPV SAMPLE: NORMAL
HPV, INTERPRETATION: NORMAL
HPV16 DNA CVX QL NAA+PROBE: NOT DETECTED
HPV18 DNA CVX QL NAA+PROBE: NOT DETECTED
SPECIMEN DESCRIPTION: NORMAL

## 2025-06-02 NOTE — PROGRESS NOTES
Attending Physician Statement  I have discussed the care of Jeramie Lainez, including pertinent history and exam findings,  with the resident. I have reviewed the key elements of all parts of the encounter with the resident.  I agree with the assessment, plan and orders as documented by the resident.  (GE Modifier)    Shanna Davis,    
apparent distress, alert, and cooperative  HEENT: head atraumatic, normocephalic, moist mucous membranes, trachea midline  Neurologic:  alert, oriented, normal speech, no focal findings or movement disorder noted  Lungs:  No increased work of breathing, no conversational dyspnea  Heart:  regular rate and rhythm and no murmur    Abdomen:  soft and non-tender  Extremities:  no calf tenderness, non edematous  Musculoskeletal: Gross strength equal and intact throughout, no gross abnormalities, range of motion normal in hips, knees, shoulders and spine  Psychiatric: Mood appropriate, normal affect   Rectal Exam: not indicated  Pelvic Exam:   Chaperone for Intimate Exam: Chaperone was present for entire exam, Chaperone Name: Nydia ANDREW  External genitalia: normal general appearance  Urinary system: urethral meatus normal  Vaginal: physiologic vaginal discharge  Cervix: normal appearance    DATA:  No results found for this visit on 25.    ASSESSMENT & PLAN:    Jeramie Lainez is a 29 y.o. female    - VSS, afebrile   - Patient's pap smear collected today   - STD testing ordered   - Patient declines birth control   - Asked patient to request previous medical records from practice in [place] for information re: vaccinations and prior pap smear    Thyroid Dysfunction   - Patient states she had abnormal thyroid function   - Repeat labs ordered today   - Patient encouraged to follow closely with PCP    There are no active problems to display for this patient.      Return in about 1 year (around 2026) for Well Woman/Annual Exam.    No Patient Care Coordination Note on file.      Counseling Completed:    Counseled about need for repeat pap as per American Society for Colposcopy and Cervical Pathology guidelines.  Counseled about birth control and barrier recommendations.  Counseled about STD counseling and prevention.  Counseled about Gardasil counseling for all patients 9-46 yo.  Counseled about Hereditary

## 2025-06-05 ENCOUNTER — RESULTS FOLLOW-UP (OUTPATIENT)
Dept: OBGYN | Age: 30
End: 2025-06-05

## 2025-06-05 DIAGNOSIS — B96.89 BACTERIAL VAGINOSIS: Primary | ICD-10-CM

## 2025-06-05 DIAGNOSIS — N76.0 BACTERIAL VAGINOSIS: Primary | ICD-10-CM

## 2025-06-05 RX ORDER — METRONIDAZOLE 500 MG/1
500 TABLET ORAL 2 TIMES DAILY
Qty: 14 TABLET | Refills: 0 | Status: SHIPPED | OUTPATIENT
Start: 2025-06-05 | End: 2025-06-12

## 2025-06-06 ENCOUNTER — TELEPHONE (OUTPATIENT)
Dept: NEUROLOGY | Age: 30
End: 2025-06-06

## 2025-06-06 ENCOUNTER — OFFICE VISIT (OUTPATIENT)
Dept: NEUROLOGY | Age: 30
End: 2025-06-06
Payer: COMMERCIAL

## 2025-06-06 VITALS
DIASTOLIC BLOOD PRESSURE: 78 MMHG | BODY MASS INDEX: 43.41 KG/M2 | HEIGHT: 63 IN | SYSTOLIC BLOOD PRESSURE: 119 MMHG | HEART RATE: 88 BPM | WEIGHT: 245 LBS

## 2025-06-06 DIAGNOSIS — G37.3 TRANSVERSE MYELITIS (HCC): ICD-10-CM

## 2025-06-06 DIAGNOSIS — E55.9 VITAMIN D DEFICIENCY: ICD-10-CM

## 2025-06-06 DIAGNOSIS — N32.81 OVERACTIVE BLADDER: ICD-10-CM

## 2025-06-06 DIAGNOSIS — G35 MULTIPLE SCLEROSIS (HCC): Primary | ICD-10-CM

## 2025-06-06 PROCEDURE — 99215 OFFICE O/P EST HI 40 MIN: CPT | Performed by: PSYCHIATRY & NEUROLOGY

## 2025-06-06 NOTE — PROGRESS NOTES
[facility] NEUROLOGY  [address]  [place] [address]  Dept: [phone]    PATIENT NAME: Jeramie Lainez  PATIENT MRN: [id number]  PRIMARY CARE PHYSICIAN: Norma Lance, APRN - CNP    History     Jeramie Lainez is a 29 y.o. female who I initially saw in consultation on 5/22/2025..      She reports that in 2018, she awoke with numbness and weakness in her right lower extremity, and her symptoms have gradually progressed since that time. She denies other associated symptoms except for intermittent blurry vision. She also describes impaired balance that fluctuates and episodes of right leg weakness and heaviness, especially with prolonged standing. Around the time these symptoms began, she developed urinary urgency. A prior structural evaluation, including a CT myelogram, was unremarkable. Due to a cochlear implant, she is unable to undergo MRI. Review of CSF studies from 3/17/2025 revealed 17 unmatched oligoclonal bands, 0 RBCs, and 4 WBCs, findings supportive of an inflammatory process. CT myelogram of the cervical, thoracic, and lumbar spine showed mild spondylosis without significant spinal stenosis, and an incidental hypodensity in the right thyroid gland. She denies a family history of multiple sclerosis. On physical exam, she is nonverbal at baseline and was assessed with assistance from an . Her exam was nonfocal apart from decreased sensation and motility in the right lower extremity, with 4+/5 weakness, generalized hyperreflexia, and right-sided clonus. She requires a walker for ambulation due to gait imbalance. Further chart review also notes a history of infertility, with unsuccessful attempts to conceive over the past three years.        TODAY'S EVALUATION     Jeramie Lainez was last seen by me on 5/22/2025.  She was seen today with .  [facility] and they were going to reach out to the

## 2025-06-06 NOTE — TELEPHONE ENCOUNTER
----- Message from Dr. Shawna Amaral, DO sent at 6/6/2025  1:20 PM EDT -----  Regarding: evoked potentials  She has not heard from [facility] to schedule these tests (VEPs and SSEPs).  Can you guys reach out to see if this has been done?      AVB

## 2025-06-06 NOTE — TELEPHONE ENCOUNTER
----- Message from Dr. Shawna Amaral, DO sent at 6/6/2025  1:24 PM EDT -----  Regarding: BLANCA  I need help making sure she gets in to see reproductive endocrinology as well.  Can you guys help?  I placed a referral at her last visit.     JANESSA

## 2025-06-06 NOTE — CONSULTS
Session ID: [id number]  Session Duration: Longer than 54 minutes  Language: ASL   ID: #[id number]   Name: Jose R: ASL   ID: #[id number]   Name: Darlin

## 2025-06-09 LAB — CYTOLOGY REPORT: NORMAL

## 2025-06-09 NOTE — TELEPHONE ENCOUNTER
Left a message with the [facility] physiology center at [phone] requesting they please reach out to patient. Requested they call back to let us know they received our message and if they need anything additional from us.

## 2025-06-09 NOTE — TELEPHONE ENCOUNTER
Do you have a preference on who she sees? The closes reproductive endocrinologist in this area that I am aware of is in [place] and is [facility]. If this is okay with you I will call and have them reach out to the patient.

## 2025-06-09 NOTE — TELEPHONE ENCOUNTER
Called [facility] office. Spoke with KAMRAN Cristobal in the office. She requested we fax the referral, office notes with neuro clearance to [phone] for Dr. Washington. Once they have received this they will contact patient to schedule.     Neuro clearance written, awaiting Dr. Primitivo billingsley, will fax to the office.

## 2025-06-30 ENCOUNTER — OFFICE VISIT (OUTPATIENT)
Dept: NEUROLOGY | Age: 30
End: 2025-06-30
Payer: COMMERCIAL

## 2025-06-30 VITALS
DIASTOLIC BLOOD PRESSURE: 76 MMHG | WEIGHT: 193.8 LBS | HEART RATE: 83 BPM | HEIGHT: 63 IN | BODY MASS INDEX: 34.34 KG/M2 | SYSTOLIC BLOOD PRESSURE: 121 MMHG

## 2025-06-30 DIAGNOSIS — N32.81 OVERACTIVE BLADDER: ICD-10-CM

## 2025-06-30 DIAGNOSIS — G37.3 TRANSVERSE MYELITIS (HCC): ICD-10-CM

## 2025-06-30 DIAGNOSIS — R20.0 NUMBNESS AND TINGLING: ICD-10-CM

## 2025-06-30 DIAGNOSIS — G35 MULTIPLE SCLEROSIS (HCC): Primary | ICD-10-CM

## 2025-06-30 DIAGNOSIS — E55.9 VITAMIN D DEFICIENCY: ICD-10-CM

## 2025-06-30 DIAGNOSIS — R27.0 ATAXIA: ICD-10-CM

## 2025-06-30 DIAGNOSIS — N97.9 INFERTILITY, FEMALE: ICD-10-CM

## 2025-06-30 DIAGNOSIS — R20.2 NUMBNESS AND TINGLING: ICD-10-CM

## 2025-06-30 DIAGNOSIS — R25.8 CLONUS: ICD-10-CM

## 2025-06-30 PROCEDURE — 99214 OFFICE O/P EST MOD 30 MIN: CPT

## 2025-06-30 ASSESSMENT — ENCOUNTER SYMPTOMS
ABDOMINAL PAIN: 0
SHORTNESS OF BREATH: 0
SORE THROAT: 0
COUGH: 0
NAUSEA: 0
RHINORRHEA: 0
VOMITING: 0

## 2025-06-30 NOTE — CONSULTS
Session ID: [id number]  Session Duration: Longer than 52 minutes  Language: ASL   ID: #[id number]   Name: Mike: ASL   ID: #[id number]   Name: Yessica

## 2025-06-30 NOTE — PROGRESS NOTES
[address]  [place] [address]  Dept: [phone]  Dept Fax: [phone]    NEUROLOGY FOLLOW UP NOTE                                              PATIENT NAME: Jeramie Lainez   PATIENT MRN: [id number]  FOLLOW UP TODAY: 6/30/2025        INITIAL & INTERVAL HISTORY:     29-year-old female with a complex, progressive neurological presentation beginning in 2018. At that time, she awoke with new-onset right lower extremity numbness and weakness. These symptoms have slowly worsened, with additional reports of fluctuating balance impairment, episodes of leg heaviness with prolonged standing, and intermittent blurry vision. Around the time her symptoms began, she also developed urinary urgency.  Due to the presence of a cochlear implant, MRI imaging has been precluded. A CT myelogram of the cervical, thoracic, and lumbar spine was unrevealing aside from mild spondylosis. CSF analysis obtained in March 2025 revealed 17 unmatched oligoclonal bands, 4 WBCs, and 0 RBCs--suggestive of a chronic inflammatory/demyelinating process.    Neurologic examination demonstrates spastic right lower extremity weakness (4+/5), right-sided clonus, decreased sensation, hyperreflexia, and ataxic gait requiring a walker. She is nonverbal at baseline, and her evaluation was conducted with  assistance. No other focal findings were observed.  She has no family history of multiple sclerosis. A history of infertility is also noted, with unsuccessful conception attempts over three years. Based on clinical history, CSF findings, and physical exam, there is high clinical suspicion for multiple sclerosis, though further ancillary testing is pending.    She follows up with Dr. Amaral and Dr. Amaral will continue assuming care for her after this point    [facility]    has a past medical history of Ataxia, Deaf, and Lower back pain.     PSH/SH/FMH:

## 2025-08-05 ENCOUNTER — HOSPITAL ENCOUNTER (OUTPATIENT)
Dept: LAB | Age: 30
Discharge: HOME | End: 2025-08-05
Payer: COMMERCIAL

## 2025-08-05 ENCOUNTER — RESULTS FOLLOW-UP (OUTPATIENT)
Dept: NEUROLOGY | Age: 30
End: 2025-08-05

## 2025-08-05 DIAGNOSIS — G37.3 TRANSVERSE MYELITIS (HCC): ICD-10-CM

## 2025-08-05 DIAGNOSIS — G35 MULTIPLE SCLEROSIS (HCC): ICD-10-CM

## 2025-08-05 DIAGNOSIS — E55.9 VITAMIN D DEFICIENCY: ICD-10-CM

## 2025-08-05 LAB
25(OH)D3 SERPL-MCNC: 15.1 NG/ML (ref 30–100)
CRP SERPL HS-MCNC: <3 MG/L (ref 0–5)
ERYTHROCYTE [SEDIMENTATION RATE] IN BLOOD BY PHOTOMETRIC METHOD: 37 MM/HR (ref 0–20)
FOLATE SERPL-MCNC: 10.4 NG/ML (ref 4.8–24.2)
SEND OUT REPORT: NORMAL
TEST NAME: NORMAL
VIT B12 SERPL-MCNC: 419 PG/ML (ref 232–1245)

## 2025-08-05 PROCEDURE — 82607 VITAMIN B-12: CPT

## 2025-08-05 PROCEDURE — 83516 IMMUNOASSAY NONANTIBODY: CPT

## 2025-08-05 PROCEDURE — 86140 C-REACTIVE PROTEIN: CPT

## 2025-08-05 PROCEDURE — 36415 COLL VENOUS BLD VENIPUNCTURE: CPT

## 2025-08-05 PROCEDURE — 86235 NUCLEAR ANTIGEN ANTIBODY: CPT

## 2025-08-05 PROCEDURE — 86038 ANTINUCLEAR ANTIBODIES: CPT

## 2025-08-05 PROCEDURE — 82746 ASSAY OF FOLIC ACID SERUM: CPT

## 2025-08-05 PROCEDURE — 85652 RBC SED RATE AUTOMATED: CPT

## 2025-08-05 PROCEDURE — 86225 DNA ANTIBODY NATIVE: CPT

## 2025-08-05 PROCEDURE — 82306 VITAMIN D 25 HYDROXY: CPT

## 2025-08-07 ENCOUNTER — TELEPHONE (OUTPATIENT)
Dept: NEUROLOGY | Age: 30
End: 2025-08-07

## 2025-08-07 DIAGNOSIS — G35 MULTIPLE SCLEROSIS (HCC): ICD-10-CM

## 2025-08-07 DIAGNOSIS — N32.81 OVERACTIVE BLADDER: ICD-10-CM

## 2025-08-07 LAB
ANA SER QL IA: NEGATIVE
DSDNA IGG SER QL IA: 1 IU/ML
ENA SCL70 AB SER IA-ACNC: <0.6 U/ML
ENA SM AB SER-ACNC: <0.8 U/ML
ENA SS-A 60KD AB SER-ACNC: <0.5 U/ML
ENA SS-A IGG SER QL: <0.3 U/ML
ENA SS-A IGG SER QL: <0.7 U/ML
ENA SS-B IGG SER IA-ACNC: <0.3 U/ML
NUCLEAR IGG SER IA-RTO: <0.1 U/ML
RIBOSOMAL P AB SER-ACNC: <1.9 U/ML
RNAP III IGG SERPL IA-ACNC: <0.7 U/ML
U1 SNRNP IGG SER IA-ACNC: 0.6 U/ML

## 2025-08-07 RX ORDER — OXYBUTYNIN CHLORIDE 10 MG/1
10 TABLET, EXTENDED RELEASE ORAL DAILY
Qty: 30 TABLET | Refills: 11 | Status: SHIPPED | OUTPATIENT
Start: 2025-08-07

## 2025-08-08 ENCOUNTER — PATIENT MESSAGE (OUTPATIENT)
Dept: PRIMARY CARE CLINIC | Age: 30
End: 2025-08-08

## 2025-08-08 DIAGNOSIS — E55.9 VITAMIN D DEFICIENCY: Primary | ICD-10-CM

## 2025-08-13 LAB
SEND OUT REPORT: NORMAL
TEST NAME: NORMAL

## 2025-08-29 ENCOUNTER — TELEPHONE (OUTPATIENT)
Dept: NEUROLOGY | Age: 30
End: 2025-08-29